# Patient Record
Sex: FEMALE | Race: ASIAN | Employment: OTHER | URBAN - METROPOLITAN AREA
[De-identification: names, ages, dates, MRNs, and addresses within clinical notes are randomized per-mention and may not be internally consistent; named-entity substitution may affect disease eponyms.]

---

## 2017-06-07 ENCOUNTER — HOSPITAL ENCOUNTER (OUTPATIENT)
Dept: MRI IMAGING | Age: 72
Discharge: HOME | End: 2017-06-07
Attending: OTOLARYNGOLOGY
Payer: MEDICARE

## 2017-06-07 DIAGNOSIS — M26.609 TMJ DISEASE: ICD-10-CM

## 2017-06-07 LAB — CREAT UR-MCNC: 0.5 MG/DL (ref 0.6–1.3)

## 2017-06-07 PROCEDURE — 70553 MRI BRAIN STEM W/O & W/DYE: CPT

## 2017-06-07 PROCEDURE — 82565 ASSAY OF CREATININE: CPT

## 2017-06-07 PROCEDURE — 74011250636 HC RX REV CODE- 250/636: Performed by: OTOLARYNGOLOGY

## 2017-06-07 PROCEDURE — A9585 GADOBUTROL INJECTION: HCPCS | Performed by: OTOLARYNGOLOGY

## 2017-06-07 RX ADMIN — GADOBUTROL 7.5 ML: 604.72 INJECTION INTRAVENOUS at 09:38

## 2017-07-15 ENCOUNTER — APPOINTMENT (OUTPATIENT)
Dept: GENERAL RADIOLOGY | Age: 72
End: 2017-07-15
Attending: PHYSICIAN ASSISTANT
Payer: MEDICARE

## 2017-07-15 ENCOUNTER — HOSPITAL ENCOUNTER (EMERGENCY)
Age: 72
Discharge: HOME | End: 2017-07-15
Attending: EMERGENCY MEDICINE
Payer: MEDICARE

## 2017-07-15 VITALS
RESPIRATION RATE: 16 BRPM | DIASTOLIC BLOOD PRESSURE: 64 MMHG | TEMPERATURE: 97.7 F | WEIGHT: 145 LBS | HEART RATE: 66 BPM | BODY MASS INDEX: 24.16 KG/M2 | HEIGHT: 65 IN | SYSTOLIC BLOOD PRESSURE: 128 MMHG | OXYGEN SATURATION: 100 %

## 2017-07-15 DIAGNOSIS — M25.562 ARTHRALGIA OF LEFT LOWER LEG: Primary | ICD-10-CM

## 2017-07-15 PROCEDURE — 73564 X-RAY EXAM KNEE 4 OR MORE: CPT

## 2017-07-15 PROCEDURE — 93971 EXTREMITY STUDY: CPT

## 2017-07-15 PROCEDURE — 99282 EMERGENCY DEPT VISIT SF MDM: CPT

## 2017-07-15 RX ORDER — IBUPROFEN 400 MG/1
400 TABLET ORAL
Qty: 20 TAB | Refills: 0 | Status: SHIPPED | OUTPATIENT
Start: 2017-07-15 | End: 2020-01-02

## 2017-07-15 NOTE — ED PROVIDER NOTES
HPI Comments: 4:05 PM  Moise Clement is a 67 y.o. female presenting to the ED C/O left knee pain onset yesterday, 3 hours into a 7 hour drive to [place]. Associated sx include mild left knee swelling. Pain is worse with walking and better at rest. Pt states she hit it on a coffee table 3 days ago, but had no pain until yesterday. She performed water aerobics for 1 hour 2 days ago without any knee pain. Pain was so severe yesterday that pt had difficulty with ambulation. Today, they drove 5 additional hours home from [place], and pain worsened. She took 2 of Ibuprofen yesterday, and 2 of Aspirin today with mild relief. SHx includes left knee arthroscopy 10 years ago. Pt denies blood thinners, h/o PE or DVT, SOB, chest pain, and any other symptoms or complaints at this time. Patient is a 67 y.o. female presenting with knee pain. The history is provided by the patient. No  was used. Knee Pain    This is a new problem. The current episode started yesterday. The problem has not changed since onset. The pain is present in the left knee. There has been a history of trauma. Past Medical History:   Diagnosis Date    Arthritis     Menopause     Palpitations 6/6/2013    Rheumatoid arthritis ([facility] [address]        Past Surgical History:   Procedure Laterality Date    HX APPENDECTOMY      HX BLADDER SUSPENSION      HX ORTHOPAEDIC      arm surgery         History reviewed. No pertinent family history. Social History     Social History    Marital status:      Spouse name: N/A    Number of children: N/A    Years of education: N/A     Occupational History    Not on file.      Social History Main Topics    Smoking status: Never Smoker    Smokeless tobacco: Never Used    Alcohol use Yes      Comment: rare    Drug use: No    Sexual activity: Not on file     Other Topics Concern    Not on file     Social History Narrative         ALLERGIES: Ciprofloxacin and Zyrtec [cetirizine]    Review of Systems   Respiratory: Negative for shortness of breath. Cardiovascular: Negative for chest pain. Musculoskeletal: Positive for arthralgias (left knee) and joint swelling (left knee). All other systems reviewed and are negative. Vitals:    07/15/17 1610   BP: 128/64   Pulse: 66   Resp: 16   Temp: 97.7 °F (36.5 °C)   SpO2: 100%   Weight: 65.8 kg (145 lb)   Height: 5' 5\" (1.651 m)            Physical Exam   Constitutional: She is oriented to person, place, and time. She appears well-developed and well-nourished. HENT:   Head: Normocephalic and atraumatic. Cardiovascular: Normal rate, regular rhythm, normal heart sounds and intact distal pulses. No murmur heard. Pulmonary/Chest: Effort normal and breath sounds normal. No respiratory distress. She has no wheezes. She has no rales. Musculoskeletal:        Left knee: She exhibits normal range of motion, no swelling, no effusion, no ecchymosis, no deformity and no erythema. Tenderness (posterior ) found. LLE: TTP over the posterior knee, no obvious swelling, erythema or increased warmth, pulses 2+ for DP and PT      Neurological: She is alert and oriented to person, place, and time. Skin: Skin is warm and dry. Psychiatric: She has a normal mood and affect. Judgment normal.   Nursing note and vitals reviewed. RESULTS:    PULSE OXIMETRY NOTE:  Pulse-ox is 100% on room air  Interpretation: Normal       DUPLEX LOWER EXT VENOUS LEFT   INTERPRETATION/FINDINGS  Duplex images were obtained using 2-D gray scale, color flow, and  spectral Doppler analysis. Left leg :  1. Deep vein(s) visualized include the common femoral, deep femoral,  proximal femoral, mid femoral, distal femoral, popliteal(above knee),  popliteal(fossa), popliteal(below knee), posterior tibial and peroneal   veins. 2. No evidence of deep venous thrombosis detected in the veins  visualized.   3. No evidence of deep vein thrombosis in the contralateral common  femoral vein. As read by the technologist.      RADIOLOGY RESULT  5:28 PM   Left knee X-ray shows no acute process. Pending review by radiologist.   Written by Vandana Riddle ED Scribe, as dictated by Samantha Mcneil PA-C. Labs Reviewed - No data to display    No results found for this or any previous visit (from the past 12 hour(s)). MDM  Number of Diagnoses or Management Options  Arthralgia of left lower leg:   Diagnosis management comments: Knee fx, ligamentous injury, DVT, muscular strain, Baker's cyst, doubt claudication, vasculitis         Amount and/or Complexity of Data Reviewed  Tests in the radiology section of CPT®: reviewed and ordered (Duplex LLE, left knee XR)  Independent visualization of images, tracings, or specimens: yes (left knee XR)      ED Course     Medications - No data to display    Procedures    PROGRESS NOTE:  4:05 PM  Initial assessment performed. Recorded by Vandana Riddle ED Scribe, as dictated by Samantha Mcneil PA-C. Discharge Note:  6:02 PM   Edwina Dixon results have been reviewed with her and/or her family. She has been counseled regarding her diagnosis, treatment, and plan. She verbally conveys understanding and agreement of the signs, symptoms, diagnosis, treatment and prognosis and additionally agrees to follow up as discussed. She also agrees with the care-plan and conveys that all of her questions have been answered. I have also provided discharge instructions for her that include: educational information regarding the diagnosis and treatment, and a list of reasons why She would want to return to the ED prior to her follow-up appointment, should her condition change. Proper ED utilization discussed with the patient. CLINICAL IMPRESSION    1.  Arthralgia of left lower leg        After visit plan:  D/c home    Discharge Medication List as of 7/15/2017  6:02 PM      START taking these medications    Details   ibuprofen (MOTRIN) 400 mg tablet Take 1 Tab by mouth every six (6) hours as needed for Pain., Print, Disp-20 Tab, R-0         CONTINUE these medications which have NOT CHANGED    Details   omega-3 fatty acids-vitamin e (FISH OIL) 1,000 mg cap Take 1 Cap by mouth., Historical Med      calcium-cholecalciferol, D3, (CALTRATE 600+D) tablet Take 1 tablet by mouth daily. , Historical Med      ascorbic acid (VITAMIN C) 500 mg tablet Take 500 mg by mouth two (2) times a day., Historical Med      cyanocobalamin 1,000 mcg tablet Take 1,000 mcg by mouth daily. , Historical Med      OTHER Black cohash 54mg daily, Historical Med         STOP taking these medications       naproxen (NAPROSYN) 375 mg tablet Comments:   Reason for Stopping: Follow-up Information     Follow up With Details Comments [address], MD Schedule an appointment as soon as possible for a visit [address] [place] [address] [address] [facility]      THE [facility] EMERGENCY DEPT  As needed, If symptoms worsen [address] [place] Code [id number]  [phone]          This note is prepared by Cortes Coughlin, acting as Scribe for Amber Wheeler PA-C. Amber Wheeler PA-C: The scribe's documentation has been prepared under my direction and personally reviewed by me in its entirety. I confirm that the note above accurately reflects all work, treatment, procedures, and medical decision making performed by me.

## 2017-07-15 NOTE — ED NOTES
I have reviewed discharge instructions with the patient and spouse. The patient and spouse verbalized understanding. Patient armband removed and shredded. One prescription given to patient.

## 2017-07-15 NOTE — PROCEDURES
[facility]  *** FINAL REPORT ***    Name: Akshat Dahl  MRN: [id number]    Outpatient  : 07 May 1945  HIS Order #: [id number]  [address] [facility] Visit #: [id number]  Date: 15 Jul 2017    TYPE OF TEST: Peripheral Venous Testing    REASON FOR TEST  Pain in limb    Left Leg:-  Deep venous thrombosis:           No  Superficial venous thrombosis:    Not examined  Deep venous insufficiency:        Not examined  Superficial venous insufficiency: Not examined      INTERPRETATION/FINDINGS  Duplex images were obtained using 2-D gray scale, color flow, and  spectral Doppler analysis. Left leg :  1. Deep vein(s) visualized include the common femoral, deep femoral,  proximal femoral, mid femoral, distal femoral, popliteal(above knee),  popliteal(fossa), popliteal(below knee), posterior tibial and peroneal   veins. 2. No evidence of deep venous thrombosis detected in the veins  visualized. 3. No evidence of deep vein thrombosis in the contralateral common  femoral vein. ADDITIONAL COMMENTS    I have personally reviewed the data relevant to the interpretation of  this  study. TECHNOLOGIST: Rafa VILLALPANDO, RVT  Signed: 07/15/2017 05:13 PM    PHYSICIAN: Ayana Bailey MD  Signed: 2017 03:15 PM

## 2017-07-15 NOTE — ED TRIAGE NOTES
Patient complaining of left knee pain. Patient reports on Wednesday she hit it against a cabinet and yesterday pain was so severe she was unable to walk. Patient reports she had an arthroscopy on left knee x10 years ago.

## 2017-10-02 ENCOUNTER — HOSPITAL ENCOUNTER (OUTPATIENT)
Dept: LAB | Age: 72
Discharge: HOME | End: 2017-10-02
Payer: MEDICARE

## 2017-10-02 PROCEDURE — 88305 TISSUE EXAM BY PATHOLOGIST: CPT | Performed by: SURGERY

## 2017-10-02 PROCEDURE — 88173 CYTOPATH EVAL FNA REPORT: CPT | Performed by: SURGERY

## 2018-10-20 ENCOUNTER — HOSPITAL ENCOUNTER (OUTPATIENT)
Dept: CT IMAGING | Age: 73
Discharge: HOME | End: 2018-10-20
Attending: OTOLARYNGOLOGY
Payer: MEDICARE

## 2018-10-20 DIAGNOSIS — R22.1 NECK MASS: ICD-10-CM

## 2018-10-20 LAB — CREAT UR-MCNC: 0.5 MG/DL (ref 0.6–1.3)

## 2018-10-20 PROCEDURE — 70491 CT SOFT TISSUE NECK W/DYE: CPT

## 2018-10-20 PROCEDURE — 82565 ASSAY OF CREATININE: CPT

## 2018-10-20 PROCEDURE — 74011636320 HC RX REV CODE- 636/320: Performed by: OTOLARYNGOLOGY

## 2018-10-20 RX ADMIN — IOPAMIDOL 75 ML: 612 INJECTION, SOLUTION INTRAVENOUS at 10:51

## 2018-11-06 ENCOUNTER — HOSPITAL ENCOUNTER (OUTPATIENT)
Dept: ULTRASOUND IMAGING | Age: 73
Discharge: HOME | End: 2018-11-06
Attending: OTOLARYNGOLOGY
Payer: MEDICARE

## 2018-11-06 DIAGNOSIS — E04.2 MULTINODULAR GOITER: ICD-10-CM

## 2018-11-06 DIAGNOSIS — E04.2 NONTOXIC MULTINODULAR GOITER: ICD-10-CM

## 2018-11-06 DIAGNOSIS — R22.1 LOCALIZED SWELLING, MASS AND LUMP, NECK: ICD-10-CM

## 2018-11-06 PROCEDURE — 76942 ECHO GUIDE FOR BIOPSY: CPT

## 2018-11-06 PROCEDURE — 88305 TISSUE EXAM BY PATHOLOGIST: CPT | Performed by: OTOLARYNGOLOGY

## 2018-11-06 PROCEDURE — 88172 CYTP DX EVAL FNA 1ST EA SITE: CPT | Performed by: OTOLARYNGOLOGY

## 2018-11-06 PROCEDURE — 88173 CYTOPATH EVAL FNA REPORT: CPT | Performed by: OTOLARYNGOLOGY

## 2018-11-06 PROCEDURE — 10022 US GUIDE FINE NDL ASP THYROID: CPT

## 2018-11-06 PROCEDURE — 88177 CYTP FNA EVAL EA ADDL: CPT | Performed by: OTOLARYNGOLOGY

## 2018-11-06 PROCEDURE — 10022 US GUIDE FINE NDL ASP W IMAGE: CPT

## 2018-11-06 RX ORDER — LIDOCAINE HYDROCHLORIDE 10 MG/ML
INJECTION, SOLUTION EPIDURAL; INFILTRATION; INTRACAUDAL; PERINEURAL
Status: DISPENSED
Start: 2018-11-06 | End: 2018-11-07

## 2018-11-06 RX ORDER — HYDRALAZINE HYDROCHLORIDE 20 MG/ML
20 INJECTION INTRAMUSCULAR; INTRAVENOUS ONCE
Status: DISCONTINUED | OUTPATIENT
Start: 2018-11-06 | End: 2018-11-06 | Stop reason: CLARIF

## 2018-11-06 RX ORDER — LIDOCAINE HYDROCHLORIDE 10 MG/ML
20 INJECTION, SOLUTION EPIDURAL; INFILTRATION; INTRACAUDAL; PERINEURAL
Status: ACTIVE | OUTPATIENT
Start: 2018-11-06 | End: 2018-11-07

## 2018-11-06 NOTE — PROGRESS NOTES
Ultrasound Guided FNA done in 3 areas- Right Submandibular- Right mid pole Thyroid- Left Medial Thyroid    Patient tolerated procedure with out pain. Patient was given ice bags and discharge instructions from Radiologist.  Patient was discharged home in stable condition.

## 2019-02-11 ENCOUNTER — HOSPITAL ENCOUNTER (OUTPATIENT)
Dept: CT IMAGING | Age: 74
Discharge: HOME | End: 2019-02-11
Attending: OTOLARYNGOLOGY
Payer: MEDICARE

## 2019-02-11 DIAGNOSIS — R22.1 MASS OF NECK: ICD-10-CM

## 2019-02-11 LAB — CREAT UR-MCNC: 0.6 MG/DL (ref 0.6–1.3)

## 2019-02-11 PROCEDURE — 82565 ASSAY OF CREATININE: CPT

## 2019-02-11 PROCEDURE — 74011636320 HC RX REV CODE- 636/320: Performed by: OTOLARYNGOLOGY

## 2019-02-11 PROCEDURE — 70491 CT SOFT TISSUE NECK W/DYE: CPT

## 2019-02-11 RX ADMIN — IOPAMIDOL 100 ML: 612 INJECTION, SOLUTION INTRAVENOUS at 09:40

## 2019-12-30 ENCOUNTER — HOSPITAL ENCOUNTER (OUTPATIENT)
Dept: PREADMISSION TESTING | Age: 74
Discharge: HOME | End: 2019-12-30
Payer: MEDICARE

## 2019-12-30 ENCOUNTER — HOSPITAL ENCOUNTER (OUTPATIENT)
Dept: GENERAL RADIOLOGY | Age: 74
Discharge: HOME | End: 2019-12-30
Payer: MEDICARE

## 2019-12-30 DIAGNOSIS — Z01.818 PREOPERATIVE EXAMINATION, UNSPECIFIED: ICD-10-CM

## 2019-12-30 LAB
ALBUMIN SERPL-MCNC: 3.9 G/DL (ref 3.4–5)
ALBUMIN/GLOB SERPL: 1.2 {RATIO} (ref 0.8–1.7)
ALP SERPL-CCNC: 75 U/L (ref 45–117)
ALT SERPL-CCNC: 34 U/L (ref 13–56)
ANION GAP SERPL CALC-SCNC: 6 MMOL/L (ref 3–18)
APPEARANCE UR: CLEAR
APTT PPP: 30.9 SEC (ref 23–36.4)
AST SERPL-CCNC: 21 U/L (ref 10–38)
BASOPHILS # BLD: 0 K/UL (ref 0–0.1)
BASOPHILS NFR BLD: 0 % (ref 0–2)
BILIRUB SERPL-MCNC: 0.2 MG/DL (ref 0.2–1)
BILIRUB UR QL: NEGATIVE
BUN SERPL-MCNC: 18 MG/DL (ref 7–18)
BUN/CREAT SERPL: 29 (ref 12–20)
CALCIUM SERPL-MCNC: 8.8 MG/DL (ref 8.5–10.1)
CHLORIDE SERPL-SCNC: 109 MMOL/L (ref 100–111)
CO2 SERPL-SCNC: 27 MMOL/L (ref 21–32)
COLOR UR: YELLOW
CREAT SERPL-MCNC: 0.63 MG/DL (ref 0.6–1.3)
DIFFERENTIAL METHOD BLD: NORMAL
EOSINOPHIL # BLD: 0.1 K/UL (ref 0–0.4)
EOSINOPHIL NFR BLD: 2 % (ref 0–5)
ERYTHROCYTE [DISTWIDTH] IN BLOOD BY AUTOMATED COUNT: 12.5 % (ref 11.6–14.5)
GLOBULIN SER CALC-MCNC: 3.2 G/DL (ref 2–4)
GLUCOSE SERPL-MCNC: 107 MG/DL (ref 74–99)
GLUCOSE UR STRIP.AUTO-MCNC: NEGATIVE MG/DL
HCT VFR BLD AUTO: 39.6 % (ref 35–45)
HGB BLD-MCNC: 13.1 G/DL (ref 12–16)
HGB UR QL STRIP: NEGATIVE
INR PPP: 0.9 (ref 0.8–1.2)
KETONES UR QL STRIP.AUTO: NEGATIVE MG/DL
LEUKOCYTE ESTERASE UR QL STRIP.AUTO: NEGATIVE
LYMPHOCYTES # BLD: 3 K/UL (ref 0.9–3.6)
LYMPHOCYTES NFR BLD: 44 % (ref 21–52)
MCH RBC QN AUTO: 31.1 PG (ref 24–34)
MCHC RBC AUTO-ENTMCNC: 33.1 G/DL (ref 31–37)
MCV RBC AUTO: 94.1 FL (ref 74–97)
MONOCYTES # BLD: 0.6 K/UL (ref 0.05–1.2)
MONOCYTES NFR BLD: 9 % (ref 3–10)
NEUTS SEG # BLD: 3 K/UL (ref 1.8–8)
NEUTS SEG NFR BLD: 45 % (ref 40–73)
NITRITE UR QL STRIP.AUTO: NEGATIVE
PH UR STRIP: 5 [PH] (ref 5–8)
PLATELET # BLD AUTO: 334 K/UL (ref 135–420)
PMV BLD AUTO: 10.3 FL (ref 9.2–11.8)
POTASSIUM SERPL-SCNC: 4.3 MMOL/L (ref 3.5–5.5)
PROT SERPL-MCNC: 7.1 G/DL (ref 6.4–8.2)
PROT UR STRIP-MCNC: NEGATIVE MG/DL
PROTHROMBIN TIME: 12.2 SEC (ref 11.5–15.2)
RBC # BLD AUTO: 4.21 M/UL (ref 4.2–5.3)
SODIUM SERPL-SCNC: 142 MMOL/L (ref 136–145)
SP GR UR REFRACTOMETRY: 1.03 (ref 1–1.03)
UROBILINOGEN UR QL STRIP.AUTO: 0.2 EU/DL (ref 0.2–1)
WBC # BLD AUTO: 6.7 K/UL (ref 4.6–13.2)

## 2019-12-30 PROCEDURE — 81003 URINALYSIS AUTO W/O SCOPE: CPT

## 2019-12-30 PROCEDURE — 85730 THROMBOPLASTIN TIME PARTIAL: CPT

## 2019-12-30 PROCEDURE — 36415 COLL VENOUS BLD VENIPUNCTURE: CPT

## 2019-12-30 PROCEDURE — 71045 X-RAY EXAM CHEST 1 VIEW: CPT

## 2019-12-30 PROCEDURE — 80053 COMPREHEN METABOLIC PANEL: CPT

## 2019-12-30 PROCEDURE — 86900 BLOOD TYPING SEROLOGIC ABO: CPT

## 2019-12-30 PROCEDURE — 85025 COMPLETE CBC W/AUTO DIFF WBC: CPT

## 2019-12-30 PROCEDURE — 87086 URINE CULTURE/COLONY COUNT: CPT

## 2019-12-30 PROCEDURE — 85610 PROTHROMBIN TIME: CPT

## 2019-12-31 LAB
BACTERIA SPEC CULT: NORMAL
BACTERIA SPEC CULT: NORMAL
SERVICE CMNT-IMP: NORMAL

## 2020-01-01 LAB
ABO + RH BLD: NORMAL
BACTERIA SPEC CULT: NORMAL
BLOOD GROUP ANTIBODIES SERPL: NORMAL
SERVICE CMNT-IMP: NORMAL
SPECIMEN EXP DATE BLD: NORMAL

## 2020-01-08 ENCOUNTER — ANESTHESIA EVENT (OUTPATIENT)
Dept: SURGERY | Age: 75
DRG: 470 | End: 2020-01-08
Payer: MEDICARE

## 2020-01-09 RX ORDER — TRANEXAMIC ACID 10 MG/ML
1 INJECTION, SOLUTION INTRAVENOUS
Status: CANCELLED | OUTPATIENT
Start: 2020-01-09 | End: 2020-01-10

## 2020-01-10 ENCOUNTER — ANESTHESIA (OUTPATIENT)
Dept: SURGERY | Age: 75
DRG: 470 | End: 2020-01-10
Payer: MEDICARE

## 2020-01-10 ENCOUNTER — APPOINTMENT (OUTPATIENT)
Dept: GENERAL RADIOLOGY | Age: 75
DRG: 470 | End: 2020-01-10
Attending: PHYSICIAN ASSISTANT
Payer: MEDICARE

## 2020-01-10 ENCOUNTER — HOSPITAL ENCOUNTER (INPATIENT)
Age: 75
LOS: 2 days | Discharge: HOME HEALTH | DRG: 470 | End: 2020-01-12
Attending: ORTHOPAEDIC SURGERY | Admitting: ORTHOPAEDIC SURGERY
Payer: MEDICARE

## 2020-01-10 DIAGNOSIS — Z96.652 TOTAL KNEE REPLACEMENT STATUS, LEFT: Primary | ICD-10-CM

## 2020-01-10 LAB
ABO + RH BLD: NORMAL
BLOOD GROUP ANTIBODIES SERPL: NORMAL
SPECIMEN EXP DATE BLD: NORMAL

## 2020-01-10 PROCEDURE — 74011250637 HC RX REV CODE- 250/637: Performed by: PHYSICIAN ASSISTANT

## 2020-01-10 PROCEDURE — 77030010507 HC ADH SKN DERMBND J&J -B: Performed by: ORTHOPAEDIC SURGERY

## 2020-01-10 PROCEDURE — 77030040815: Performed by: ORTHOPAEDIC SURGERY

## 2020-01-10 PROCEDURE — 77030002933 HC SUT MCRYL J&J -A: Performed by: ORTHOPAEDIC SURGERY

## 2020-01-10 PROCEDURE — 77010033678 HC OXYGEN DAILY

## 2020-01-10 PROCEDURE — C9290 INJ, BUPIVACAINE LIPOSOME: HCPCS | Performed by: ORTHOPAEDIC SURGERY

## 2020-01-10 PROCEDURE — 77030038010: Performed by: ORTHOPAEDIC SURGERY

## 2020-01-10 PROCEDURE — 77030018842 HC SOL IRR SOD CL 9% BAXT -A: Performed by: ORTHOPAEDIC SURGERY

## 2020-01-10 PROCEDURE — 73560 X-RAY EXAM OF KNEE 1 OR 2: CPT

## 2020-01-10 PROCEDURE — 74011250636 HC RX REV CODE- 250/636: Performed by: PHYSICIAN ASSISTANT

## 2020-01-10 PROCEDURE — 77030000032 HC CUF TRNQT ZIMM -B: Performed by: ORTHOPAEDIC SURGERY

## 2020-01-10 PROCEDURE — 74011250636 HC RX REV CODE- 250/636: Performed by: ANESTHESIOLOGY

## 2020-01-10 PROCEDURE — 74011000250 HC RX REV CODE- 250: Performed by: NURSE ANESTHETIST, CERTIFIED REGISTERED

## 2020-01-10 PROCEDURE — 64450 NJX AA&/STRD OTHER PN/BRANCH: CPT | Performed by: ANESTHESIOLOGY

## 2020-01-10 PROCEDURE — 86900 BLOOD TYPING SEROLOGIC ABO: CPT

## 2020-01-10 PROCEDURE — 74011250636 HC RX REV CODE- 250/636: Performed by: ORTHOPAEDIC SURGERY

## 2020-01-10 PROCEDURE — 77030020782 HC GWN BAIR PAWS FLX 3M -B: Performed by: ORTHOPAEDIC SURGERY

## 2020-01-10 PROCEDURE — 74011000250 HC RX REV CODE- 250: Performed by: ORTHOPAEDIC SURGERY

## 2020-01-10 PROCEDURE — 76942 ECHO GUIDE FOR BIOPSY: CPT | Performed by: ORTHOPAEDIC SURGERY

## 2020-01-10 PROCEDURE — 77030014144 HC TY SPN ANES BBMI -B: Performed by: ANESTHESIOLOGY

## 2020-01-10 PROCEDURE — 77030033067 HC SUT PDO STRATFX SPIR J&J -B: Performed by: ORTHOPAEDIC SURGERY

## 2020-01-10 PROCEDURE — C1776 JOINT DEVICE (IMPLANTABLE): HCPCS | Performed by: ORTHOPAEDIC SURGERY

## 2020-01-10 PROCEDURE — 77030040361 HC SLV COMPR DVT MDII -B: Performed by: ORTHOPAEDIC SURGERY

## 2020-01-10 PROCEDURE — 76060000035 HC ANESTHESIA 2 TO 2.5 HR: Performed by: ORTHOPAEDIC SURGERY

## 2020-01-10 PROCEDURE — 0SRD0J9 REPLACEMENT OF LEFT KNEE JOINT WITH SYNTHETIC SUBSTITUTE, CEMENTED, OPEN APPROACH: ICD-10-PCS | Performed by: ORTHOPAEDIC SURGERY

## 2020-01-10 PROCEDURE — 36415 COLL VENOUS BLD VENIPUNCTURE: CPT

## 2020-01-10 PROCEDURE — 74011000258 HC RX REV CODE- 258: Performed by: NURSE ANESTHETIST, CERTIFIED REGISTERED

## 2020-01-10 PROCEDURE — 77030011628: Performed by: ORTHOPAEDIC SURGERY

## 2020-01-10 PROCEDURE — C1713 ANCHOR/SCREW BN/BN,TIS/BN: HCPCS | Performed by: ORTHOPAEDIC SURGERY

## 2020-01-10 PROCEDURE — 77030037875 HC DRSG MEPILEX <16IN BORD MOLN -A: Performed by: ORTHOPAEDIC SURGERY

## 2020-01-10 PROCEDURE — 77030031139 HC SUT VCRL2 J&J -A: Performed by: ORTHOPAEDIC SURGERY

## 2020-01-10 PROCEDURE — 74011250637 HC RX REV CODE- 250/637: Performed by: ANESTHESIOLOGY

## 2020-01-10 PROCEDURE — 74011000258 HC RX REV CODE- 258: Performed by: ORTHOPAEDIC SURGERY

## 2020-01-10 PROCEDURE — 65270000029 HC RM PRIVATE

## 2020-01-10 PROCEDURE — 76010000131 HC OR TIME 2 TO 2.5 HR: Performed by: ORTHOPAEDIC SURGERY

## 2020-01-10 PROCEDURE — 77030016060 HC NDL NRV BLK TELE -A: Performed by: ANESTHESIOLOGY

## 2020-01-10 PROCEDURE — 76210000006 HC OR PH I REC 0.5 TO 1 HR: Performed by: ORTHOPAEDIC SURGERY

## 2020-01-10 PROCEDURE — 74011250636 HC RX REV CODE- 250/636: Performed by: NURSE ANESTHETIST, CERTIFIED REGISTERED

## 2020-01-10 PROCEDURE — 77030013708 HC HNDPC SUC IRR PULS STRY –B: Performed by: ORTHOPAEDIC SURGERY

## 2020-01-10 PROCEDURE — 77030027138 HC INCENT SPIROMETER -A: Performed by: ORTHOPAEDIC SURGERY

## 2020-01-10 DEVICE — CEMENT BNE 40GM FULL DOSE PMMA W/O ANTIBIO HI VISC N RADPQ: Type: IMPLANTABLE DEVICE | Site: KNEE | Status: FUNCTIONAL

## 2020-01-10 DEVICE — ATTUNE KNEE SYSTEM TIBIAL INSERT ROTATING PLATFORM POSTERIOR STABILIZED 4 6MM AOX
Type: IMPLANTABLE DEVICE | Site: KNEE | Status: FUNCTIONAL
Brand: ATTUNE

## 2020-01-10 DEVICE — INSERT TIB RP FEM KNEE CEM: Type: IMPLANTABLE DEVICE | Site: KNEE | Status: FUNCTIONAL

## 2020-01-10 DEVICE — ATTUNE PATELLA MEDIALIZED ANATOMIC 35MM CEMENTED AOX
Type: IMPLANTABLE DEVICE | Site: KNEE | Status: FUNCTIONAL
Brand: ATTUNE

## 2020-01-10 DEVICE — ATTUNE KNEE SYSTEM TIBIAL BASE ROTATING PLATFORM SIZE 4 CEMENTED
Type: IMPLANTABLE DEVICE | Site: KNEE | Status: FUNCTIONAL
Brand: ATTUNE

## 2020-01-10 DEVICE — CEMENT BNE 40GM FULL DOSE PMMA W/ GENT HI VISC RADPQ LNG: Type: IMPLANTABLE DEVICE | Site: KNEE | Status: FUNCTIONAL

## 2020-01-10 DEVICE — ATTUNE KNEE SYSTEM FEMORAL POSTERIOR STABILIZED NARROW SIZE 4N LEFT CEMENTED
Type: IMPLANTABLE DEVICE | Site: KNEE | Status: FUNCTIONAL
Brand: ATTUNE

## 2020-01-10 RX ORDER — LANOLIN ALCOHOL/MO/W.PET/CERES
1 CREAM (GRAM) TOPICAL
Status: DISCONTINUED | OUTPATIENT
Start: 2020-01-11 | End: 2020-01-12 | Stop reason: HOSPADM

## 2020-01-10 RX ORDER — HYDROMORPHONE HYDROCHLORIDE 1 MG/ML
1 INJECTION, SOLUTION INTRAMUSCULAR; INTRAVENOUS; SUBCUTANEOUS
Status: DISCONTINUED | OUTPATIENT
Start: 2020-01-10 | End: 2020-01-12 | Stop reason: HOSPADM

## 2020-01-10 RX ORDER — SODIUM CHLORIDE 0.9 % (FLUSH) 0.9 %
5-40 SYRINGE (ML) INJECTION AS NEEDED
Status: DISCONTINUED | OUTPATIENT
Start: 2020-01-10 | End: 2020-01-12 | Stop reason: HOSPADM

## 2020-01-10 RX ORDER — SODIUM CHLORIDE 0.9 % (FLUSH) 0.9 %
5-40 SYRINGE (ML) INJECTION EVERY 8 HOURS
Status: DISCONTINUED | OUTPATIENT
Start: 2020-01-10 | End: 2020-01-12 | Stop reason: HOSPADM

## 2020-01-10 RX ORDER — DIPHENHYDRAMINE HCL 25 MG
25 CAPSULE ORAL
Status: DISCONTINUED | OUTPATIENT
Start: 2020-01-10 | End: 2020-01-12 | Stop reason: HOSPADM

## 2020-01-10 RX ORDER — LORAZEPAM 0.5 MG/1
0.5 TABLET ORAL
Status: DISCONTINUED | OUTPATIENT
Start: 2020-01-10 | End: 2020-01-12 | Stop reason: HOSPADM

## 2020-01-10 RX ORDER — SODIUM CHLORIDE, SODIUM LACTATE, POTASSIUM CHLORIDE, CALCIUM CHLORIDE 600; 310; 30; 20 MG/100ML; MG/100ML; MG/100ML; MG/100ML
100 INJECTION, SOLUTION INTRAVENOUS CONTINUOUS
Status: DISPENSED | OUTPATIENT
Start: 2020-01-10 | End: 2020-01-11

## 2020-01-10 RX ORDER — SCOLOPAMINE TRANSDERMAL SYSTEM 1 MG/1
1 PATCH, EXTENDED RELEASE TRANSDERMAL
Status: DISCONTINUED | OUTPATIENT
Start: 2020-01-10 | End: 2020-01-10 | Stop reason: SDUPTHER

## 2020-01-10 RX ORDER — KETOROLAC TROMETHAMINE 15 MG/ML
15 INJECTION, SOLUTION INTRAMUSCULAR; INTRAVENOUS EVERY 6 HOURS
Status: DISPENSED | OUTPATIENT
Start: 2020-01-10 | End: 2020-01-11

## 2020-01-10 RX ORDER — MONTELUKAST SODIUM 10 MG/1
10 TABLET ORAL AS NEEDED
Status: DISCONTINUED | OUTPATIENT
Start: 2020-01-10 | End: 2020-01-12 | Stop reason: HOSPADM

## 2020-01-10 RX ORDER — SODIUM CHLORIDE, SODIUM LACTATE, POTASSIUM CHLORIDE, CALCIUM CHLORIDE 600; 310; 30; 20 MG/100ML; MG/100ML; MG/100ML; MG/100ML
1000 INJECTION, SOLUTION INTRAVENOUS CONTINUOUS
Status: DISCONTINUED | OUTPATIENT
Start: 2020-01-10 | End: 2020-01-10 | Stop reason: HOSPADM

## 2020-01-10 RX ORDER — ONDANSETRON 2 MG/ML
4 INJECTION INTRAMUSCULAR; INTRAVENOUS
Status: DISCONTINUED | OUTPATIENT
Start: 2020-01-10 | End: 2020-01-12 | Stop reason: HOSPADM

## 2020-01-10 RX ORDER — FLUMAZENIL 0.1 MG/ML
0.2 INJECTION INTRAVENOUS
Status: DISCONTINUED | OUTPATIENT
Start: 2020-01-10 | End: 2020-01-10 | Stop reason: HOSPADM

## 2020-01-10 RX ORDER — SODIUM CHLORIDE 0.9 % (FLUSH) 0.9 %
5-40 SYRINGE (ML) INJECTION EVERY 8 HOURS
Status: DISCONTINUED | OUTPATIENT
Start: 2020-01-10 | End: 2020-01-10 | Stop reason: HOSPADM

## 2020-01-10 RX ORDER — ALBUTEROL SULFATE 0.83 MG/ML
2.5 SOLUTION RESPIRATORY (INHALATION) AS NEEDED
Status: DISCONTINUED | OUTPATIENT
Start: 2020-01-10 | End: 2020-01-10 | Stop reason: HOSPADM

## 2020-01-10 RX ORDER — OXYCODONE HYDROCHLORIDE 5 MG/1
5 TABLET ORAL
Status: DISCONTINUED | OUTPATIENT
Start: 2020-01-10 | End: 2020-01-12 | Stop reason: HOSPADM

## 2020-01-10 RX ORDER — OXYCODONE AND ACETAMINOPHEN 5; 325 MG/1; MG/1
1 TABLET ORAL AS NEEDED
Status: DISCONTINUED | OUTPATIENT
Start: 2020-01-10 | End: 2020-01-10 | Stop reason: HOSPADM

## 2020-01-10 RX ORDER — HYDROMORPHONE HYDROCHLORIDE 2 MG/ML
0.5 INJECTION, SOLUTION INTRAMUSCULAR; INTRAVENOUS; SUBCUTANEOUS
Status: DISCONTINUED | OUTPATIENT
Start: 2020-01-10 | End: 2020-01-10 | Stop reason: HOSPADM

## 2020-01-10 RX ORDER — BUPIVACAINE HYDROCHLORIDE 2.5 MG/ML
INJECTION, SOLUTION EPIDURAL; INFILTRATION; INTRACAUDAL AS NEEDED
Status: DISCONTINUED | OUTPATIENT
Start: 2020-01-10 | End: 2020-01-10 | Stop reason: HOSPADM

## 2020-01-10 RX ORDER — NALOXONE HYDROCHLORIDE 0.4 MG/ML
0.2 INJECTION, SOLUTION INTRAMUSCULAR; INTRAVENOUS; SUBCUTANEOUS AS NEEDED
Status: DISCONTINUED | OUTPATIENT
Start: 2020-01-10 | End: 2020-01-10 | Stop reason: HOSPADM

## 2020-01-10 RX ORDER — NALOXONE HYDROCHLORIDE 0.4 MG/ML
0.1 INJECTION, SOLUTION INTRAMUSCULAR; INTRAVENOUS; SUBCUTANEOUS AS NEEDED
Status: DISCONTINUED | OUTPATIENT
Start: 2020-01-10 | End: 2020-01-10 | Stop reason: HOSPADM

## 2020-01-10 RX ORDER — ACETAMINOPHEN 500 MG
1000 TABLET ORAL
Status: COMPLETED | OUTPATIENT
Start: 2020-01-10 | End: 2020-01-10

## 2020-01-10 RX ORDER — PROPOFOL 10 MG/ML
INJECTION, EMULSION INTRAVENOUS
Status: DISCONTINUED | OUTPATIENT
Start: 2020-01-10 | End: 2020-01-10 | Stop reason: HOSPADM

## 2020-01-10 RX ORDER — PROCHLORPERAZINE EDISYLATE 5 MG/ML
5 INJECTION INTRAMUSCULAR; INTRAVENOUS ONCE
Status: DISCONTINUED | OUTPATIENT
Start: 2020-01-10 | End: 2020-01-10 | Stop reason: HOSPADM

## 2020-01-10 RX ORDER — MAGNESIUM SULFATE 100 %
4 CRYSTALS MISCELLANEOUS AS NEEDED
Status: DISCONTINUED | OUTPATIENT
Start: 2020-01-10 | End: 2020-01-10 | Stop reason: HOSPADM

## 2020-01-10 RX ORDER — FENTANYL CITRATE 50 UG/ML
25 INJECTION, SOLUTION INTRAMUSCULAR; INTRAVENOUS AS NEEDED
Status: DISCONTINUED | OUTPATIENT
Start: 2020-01-10 | End: 2020-01-10 | Stop reason: HOSPADM

## 2020-01-10 RX ORDER — SCOLOPAMINE TRANSDERMAL SYSTEM 1 MG/1
1 PATCH, EXTENDED RELEASE TRANSDERMAL
Status: DISCONTINUED | OUTPATIENT
Start: 2020-01-10 | End: 2020-01-10 | Stop reason: HOSPADM

## 2020-01-10 RX ORDER — CEFAZOLIN SODIUM 2 G/50ML
2 SOLUTION INTRAVENOUS ONCE
Status: COMPLETED | OUTPATIENT
Start: 2020-01-10 | End: 2020-01-10

## 2020-01-10 RX ORDER — FLUTICASONE PROPIONATE 50 MCG
2 SPRAY, SUSPENSION (ML) NASAL AS NEEDED
Status: DISCONTINUED | OUTPATIENT
Start: 2020-01-10 | End: 2020-01-12 | Stop reason: HOSPADM

## 2020-01-10 RX ORDER — SODIUM CHLORIDE 0.9 % (FLUSH) 0.9 %
5-40 SYRINGE (ML) INJECTION AS NEEDED
Status: DISCONTINUED | OUTPATIENT
Start: 2020-01-10 | End: 2020-01-10 | Stop reason: HOSPADM

## 2020-01-10 RX ORDER — NALOXONE HYDROCHLORIDE 0.4 MG/ML
0.4 INJECTION, SOLUTION INTRAMUSCULAR; INTRAVENOUS; SUBCUTANEOUS AS NEEDED
Status: DISCONTINUED | OUTPATIENT
Start: 2020-01-10 | End: 2020-01-12 | Stop reason: HOSPADM

## 2020-01-10 RX ORDER — SODIUM CHLORIDE, SODIUM LACTATE, POTASSIUM CHLORIDE, CALCIUM CHLORIDE 600; 310; 30; 20 MG/100ML; MG/100ML; MG/100ML; MG/100ML
125 INJECTION, SOLUTION INTRAVENOUS CONTINUOUS
Status: DISCONTINUED | OUTPATIENT
Start: 2020-01-10 | End: 2020-01-10

## 2020-01-10 RX ORDER — ROPIVACAINE HYDROCHLORIDE 5 MG/ML
INJECTION, SOLUTION EPIDURAL; INFILTRATION; PERINEURAL
Status: COMPLETED | OUTPATIENT
Start: 2020-01-10 | End: 2020-01-10

## 2020-01-10 RX ORDER — ACETAMINOPHEN 500 MG
1000 TABLET ORAL EVERY 8 HOURS
Status: DISCONTINUED | OUTPATIENT
Start: 2020-01-10 | End: 2020-01-12 | Stop reason: HOSPADM

## 2020-01-10 RX ORDER — CELECOXIB 100 MG/1
400 CAPSULE ORAL
Status: COMPLETED | OUTPATIENT
Start: 2020-01-10 | End: 2020-01-10

## 2020-01-10 RX ORDER — DEXTROSE MONOHYDRATE 100 MG/ML
125-250 INJECTION, SOLUTION INTRAVENOUS AS NEEDED
Status: DISCONTINUED | OUTPATIENT
Start: 2020-01-10 | End: 2020-01-10 | Stop reason: HOSPADM

## 2020-01-10 RX ORDER — OXYCODONE HYDROCHLORIDE 5 MG/1
10 TABLET ORAL
Status: DISCONTINUED | OUTPATIENT
Start: 2020-01-10 | End: 2020-01-12 | Stop reason: HOSPADM

## 2020-01-10 RX ORDER — FENTANYL CITRATE 50 UG/ML
50 INJECTION, SOLUTION INTRAMUSCULAR; INTRAVENOUS AS NEEDED
Status: DISCONTINUED | OUTPATIENT
Start: 2020-01-10 | End: 2020-01-10 | Stop reason: HOSPADM

## 2020-01-10 RX ORDER — MIDAZOLAM HYDROCHLORIDE 1 MG/ML
INJECTION, SOLUTION INTRAMUSCULAR; INTRAVENOUS
Status: COMPLETED | OUTPATIENT
Start: 2020-01-10 | End: 2020-01-10

## 2020-01-10 RX ORDER — CEFAZOLIN SODIUM 2 G/50ML
2 SOLUTION INTRAVENOUS EVERY 8 HOURS
Status: COMPLETED | OUTPATIENT
Start: 2020-01-10 | End: 2020-01-11

## 2020-01-10 RX ORDER — DIPHENHYDRAMINE HYDROCHLORIDE 50 MG/ML
12.5 INJECTION, SOLUTION INTRAMUSCULAR; INTRAVENOUS
Status: DISCONTINUED | OUTPATIENT
Start: 2020-01-10 | End: 2020-01-10 | Stop reason: HOSPADM

## 2020-01-10 RX ORDER — AMOXICILLIN 250 MG
1 CAPSULE ORAL 2 TIMES DAILY
Status: DISCONTINUED | OUTPATIENT
Start: 2020-01-10 | End: 2020-01-12 | Stop reason: HOSPADM

## 2020-01-10 RX ORDER — ASPIRIN 81 MG/1
81 TABLET ORAL 2 TIMES DAILY
Status: DISCONTINUED | OUTPATIENT
Start: 2020-01-10 | End: 2020-01-12 | Stop reason: HOSPADM

## 2020-01-10 RX ADMIN — TRANEXAMIC ACID 1 G: 100 INJECTION, SOLUTION INTRAVENOUS at 14:30

## 2020-01-10 RX ADMIN — PROPOFOL 50 MCG/KG/MIN: 10 INJECTION, EMULSION INTRAVENOUS at 13:36

## 2020-01-10 RX ADMIN — Medication 81 MG: at 20:13

## 2020-01-10 RX ADMIN — OXYCODONE HYDROCHLORIDE 10 MG: 5 TABLET ORAL at 17:35

## 2020-01-10 RX ADMIN — ONDANSETRON 4 MG: 2 INJECTION INTRAMUSCULAR; INTRAVENOUS at 22:25

## 2020-01-10 RX ADMIN — ACETAMINOPHEN 1000 MG: 500 TABLET ORAL at 10:55

## 2020-01-10 RX ADMIN — SODIUM CHLORIDE, SODIUM LACTATE, POTASSIUM CHLORIDE, AND CALCIUM CHLORIDE 100 ML/HR: 600; 310; 30; 20 INJECTION, SOLUTION INTRAVENOUS at 17:37

## 2020-01-10 RX ADMIN — CELECOXIB 400 MG: 100 CAPSULE ORAL at 10:55

## 2020-01-10 RX ADMIN — ACETAMINOPHEN 1000 MG: 500 TABLET ORAL at 18:06

## 2020-01-10 RX ADMIN — KETOROLAC TROMETHAMINE 15 MG: 15 INJECTION, SOLUTION INTRAMUSCULAR; INTRAVENOUS at 17:35

## 2020-01-10 RX ADMIN — CEFAZOLIN SODIUM 2 G: 2 SOLUTION INTRAVENOUS at 12:58

## 2020-01-10 RX ADMIN — SODIUM CHLORIDE, SODIUM LACTATE, POTASSIUM CHLORIDE, AND CALCIUM CHLORIDE: 600; 310; 30; 20 INJECTION, SOLUTION INTRAVENOUS at 13:58

## 2020-01-10 RX ADMIN — MEPIVACAINE HYDROCHLORIDE 10 ML: 20 INJECTION, SOLUTION EPIDURAL; INFILTRATION at 12:00

## 2020-01-10 RX ADMIN — ROPIVACAINE HYDROCHLORIDE 20 ML: 5 INJECTION, SOLUTION EPIDURAL; INFILTRATION; PERINEURAL at 12:00

## 2020-01-10 RX ADMIN — TRANEXAMIC ACID 1 G: 100 INJECTION, SOLUTION INTRAVENOUS at 13:09

## 2020-01-10 RX ADMIN — MIDAZOLAM 2 MG: 1 INJECTION INTRAMUSCULAR; INTRAVENOUS at 12:00

## 2020-01-10 RX ADMIN — OXYCODONE HYDROCHLORIDE 5 MG: 5 TABLET ORAL at 23:32

## 2020-01-10 RX ADMIN — SODIUM CHLORIDE, SODIUM LACTATE, POTASSIUM CHLORIDE, AND CALCIUM CHLORIDE 1000 ML: 600; 310; 30; 20 INJECTION, SOLUTION INTRAVENOUS at 16:03

## 2020-01-10 RX ADMIN — KETOROLAC TROMETHAMINE 15 MG: 15 INJECTION, SOLUTION INTRAMUSCULAR; INTRAVENOUS at 23:31

## 2020-01-10 RX ADMIN — MIDAZOLAM 1 MG: 1 INJECTION INTRAMUSCULAR; INTRAVENOUS at 13:09

## 2020-01-10 RX ADMIN — SENNOSIDES AND DOCUSATE SODIUM 1 TABLET: 8.6; 5 TABLET ORAL at 20:13

## 2020-01-10 RX ADMIN — MIDAZOLAM 1 MG: 1 INJECTION INTRAMUSCULAR; INTRAVENOUS at 12:58

## 2020-01-10 RX ADMIN — CEFAZOLIN SODIUM 2 G: 2 SOLUTION INTRAVENOUS at 20:13

## 2020-01-10 RX ADMIN — SODIUM CHLORIDE, SODIUM LACTATE, POTASSIUM CHLORIDE, AND CALCIUM CHLORIDE 125 ML/HR: 600; 310; 30; 20 INJECTION, SOLUTION INTRAVENOUS at 10:49

## 2020-01-10 NOTE — PERIOP NOTES
Family updated , pt to go to [facility], Madelin Leventhal, RN will be receiving nurse. SBAR is ready.

## 2020-01-10 NOTE — OP NOTES
[address]  [address] [facility], [address] [place] [address], [phone]    TOTAL KNEE ARTHROPLASTY    Patient: César Ramírez MRN: [id number]  SSN: [id number]    YOB: 1945  Age: 76 y.o. Sex: female      Date of Surgery: 1/10/2020   Preoperative Diagnosis: OSTEOARTHRITIS LEFT KNEE   Postoperative Diagnosis: OSTEOARTHRITIS LEFT KNEE   Location: [facility]  Surgeon: Kaleen Nissen, MD  Physician Assistant: BERTO Potts was medically necessary for holding of retractors for exposure and to complete the case. Assistant: Circ-1: Saeid Cruz RN  Physician Assistant: Mesfin Tompkins PA-C  Retractor Lundy: Jeff Flores  Surg Asst-1: Teaganather Perfect    Anesthesia: Regional + Low femoral Nerve Block + local    Procedure: Left Total Knee Arthroplasty (CPT: 47816)    Findings:  Degenerative joint disease of the left knee     Estimated Blood Loss: 50 mL    Fluids: see anesthesia record    Specimens: None    Cultures: None    Complications: None    Tourniquet Time:   Total Tourniquet Time Documented:  Thigh (Left) - 57 minutes  Total: Thigh (Left) - 57 minutes    Tourniquet Pressure: 250 mm Hg    Tranexamic Acid: 1 gram IV: one dose at begining of case and one at the end    Exparel solution: 20 mL (13 mg/mL) + 40 mL NS    Implants:   Implant Name Type Inv.  Item Serial No.  Lot No. LRB No. Used Action   CEMENT BNE GENTAM GHV 40GM -- SMARTSET - [id number]  CEMENT  [facility] GHV 40GM -- SMARTSET  [address] [employer] [facility] [id number] Left 1 Implanted   CEMENT BNE SMARTSET HV 40GM -- ORDER IN SETS OF 20 - [id number]  CEMENT BNE SMARTSET HV 40GM -- ORDER IN SETS OF 20  [employer] [id number] Left 1 Implanted   TIBIAL BASE     [employer] [id number] Left 1 Implanted   FEM PS ABDULLAHI APARNA LT SZ 4 -- ATTUNE - [id number]  FEM PS ABDULLAHI APARNA LT SZ 4 -- ATTUNE  [employer] [id number] Left 1 Implanted   INSERT TIB PS RP SZ4 6MM -- ATTUNE - [id number]  INSERT TIB PS RP SZ4 6MM -- ATTUNE  [facility] [employer] [id number] Left 1 Implanted   PAT APARNA MEDIAL JAYDEN 35MM -- ATTUNE - [id number]  PAT APARNA MEDIAL JAYDEN 35MM -- ATTUNE  [facility] [id number] Left 1 Implanted        Patient Condition: Stable.    ---------  INDICATIONS:   This 76y.o.-year-old female has had pain in the left knee for years and has become functionally disabled with respect to the activities of daily living. The pain is increased with weight-bearing. The pain and dysfunction were not releaved by at least three months of conservative therapy such as NSAIDS (ibuprofen, aleve), analgesics (tylenol), structured flexibility and muscle strengthening physical therapy exercises, activity restrictions, intra-articular cortisone injections, bracing, and use of a cane. The radiographs showed varus alignment and advanced arthritis with joint space narrowing, subchondral sclerosis, and periarticular osteophytes. A left total knee replacement has been recommended. The risks and the possible complications of this surgery and anesthesia including, but not exclusive to, bleeding, infection, dislocation, fracture, blood clots, nerve and vascular injury, possible need for other procedures, and possibly death have been explained to the patient. The patient accepts these risks for the benefits of joint replacement over the failure of non-operative care to provide adequate pain relief and improve their functional disability. The patients medical problems have been addressed by their primary care physician and are deemed stable and as well controlled as possible. Inpatient hospital care was medically necessary, reasonable, and appropriate. This is a medically necessary procedure. As such, without use of a surgical assistant to retract soft tissues, protect vital neuro-vascular structures and assist in the technical aspects of the operation, this procedure would not have been possible. Therefore this assistant was medically necessary. DESCRIPTION OF PROCEDURE:    After the risks and benefits of surgery were discussed, informed consent was obtained. The patient was identified in the preoperative holding area and the operative extremity was marked. Preoperatively a low femoral nerve block was performed by anesthesia. The patient was taken to the operating room and placed in the supine position. Spinal anesthesia was performed. IV antibiotics were given. After verification of the appropriate operative site from the consent form, with confirmation of surgeon, anesthesia and nursing teams, a tourniquet was placed and the left leg was prepped and draped in sterile fashion using Chloraprep. A formal timeout was performed. The tourniquet was inflated and a midline incision was made over the anterior knee medial to the tibial tubercle and the dissection was taken down to Mignon's fascia. A medial parapatellar arthrotomy was performed, medial release was made and the infrapatellar fat pad was trimmed. The anterior portions of the medial and lateral menisci were excised. The patella measured 21 mm. A freehand patellar cut was made followed by placement of the protective plate. The knee was flexed and the patella was  allowed to subluxate into the lateral gutter and the knee was flexed. Inspection of the knee revealed cartilage loss from all three compartments greatest medially. The femur was drilled and intramedullary cutting jig was placed in 5 degrees of valgus and 9 mm of distal resection. The distal femoral cut was made. The tibia was then subluxed anteriorly with a large bent knee retractor. The PCL was released from the posterior tibia. The remaining medial and lateral menisci and the periarticular osteophytes were removed. The lateral genicular artery was cauterized.   An extramedullary tibial guide was used and a tibial cut was made just inferior to the osteoarticular junction with tibial slope of 10 degrees - matching the patient's native slope. The extension gap was assessed to ensure full extension could be achieved. The PCL was released. A sizing tibial plate was then pinned into place and alignment was checked. The tibia was reamed and broached and any additional osteophytes were removed. The femoral sizer was used to measure the femoral size as well as estimate femoral component rotation using the posterior condylar line as a reference. Gap measuring blocks and the gap /sizing device was then used to examine flexion and extension gaps and confirm femoral component size and rotation. Positioning pins for the distal femoral cutting block were placed into the femur through the /sizing device. The four-in-one cutting block was placed. Anterior-posterior position of the cutting guide was checked using a [employer]. The anterior, posterior and chamfer cuts were made. Using the Femoral Finishing Guide, posterior osteophytes were removed and the notch bone was removed. Tibial and femoral trial implants were placed. The patella preparation was then completed with sizing and drilling of the patellar lugs. The trial patellar implant was placed and measured 22 mm. Patellar tracking was midline so no lateral retinacular release was needed. Ligament balancing was performed as needed with release of MCL done on approach. The PCL was sacrificed so a PS RP implant was used. Excellent stability was achieved. The trial components were removed. 20 cc of Exparel solution and 10 cc of 0.25% Marcaine were injected into the posterior soft tissues. Care was taken to aspirate prior to injecting to prevent intravascular anesthetic injection. The cement was prepared. After preparing the bony surfaces with pulsatile lavage saline, the real components were cemented into place with the trial liner.   Excess cement was removed prior to hardening with the cement allowed to set up with axial pressure across the knee in full extension. The remaining 40 cc of Exparel solution and an additional 20 cc of 0.25% Marcaine were injected into the deep and superficial soft tissues around the knee as well as the periosteum. After drying of the cement, the knee was checked for any remaining excess cement and irrigated. The tourniquet was released and hemostasis was achieved. The real polyethylene liner was inserted. A standard layered closure was performed using #1 Stratafix PDS for the fascia, 0 and 3-0 Vicryl for the subcutaneous and subcuticular layers followed by a running subcuticular skin closure with a 3-0 Monocryl. PRINEO was applied. Sterile dressings were placed. The patient was awakened and there were no complications. Final sponge and needle counts were correct x2.     Steven Ku MD

## 2020-01-10 NOTE — ROUTINE PROCESS
TRANSFER - IN REPORT:    Verbal report received from [facility] SULEMAN GERMAN RN(name) on [facility]  being received from [facility]) for routine post - op      Report consisted of patients Situation, Background, Assessment and   Recommendations(SBAR). Information from the following report(s) SBAR, Kardex, [facility] and Recent Results was reviewed with the receiving nurse. Opportunity for questions and clarification was provided. Assessment completed upon patients arrival to unit and care assumed.

## 2020-01-10 NOTE — PROGRESS NOTES
Assumed care of patient. Patient is alert and oriented x 4. Patient is calm and cooperative. Patients denies numbness or tingling to upper extermities. Pt states she has numbness in lower extermities pedal pulses palpable and equal bilaterally. Capillary Refill less than 3 seconds. Lung sounds clear bilaterally. Respirations even and unlabored. no use of accessory muscles. Abdomen is soft and non-tender. Bowel sounds active to x4 quadrants. Patient has not voided post-operatively. No bladder distention evident. No complaints of bladder discomfort. Skin is warm , dry and skin color is appropriate to race. Ace wrap dressing to left knee noted CDI. no other skin integrity issues present. Margarito hose applied to right lower extermity. Plexis applied to bilaterally. 18G IV intact to R hand and infusing without difficulty. Reports pain 2/10. Call bell within reach. Bed in low position. Three side rails up. 1735-Gave PRN pain medication at this time. Gave patient 10mg of Roxicodone. Pt stated pain was 6/10. Explanation of side effects given. Opportunity for questions given. Shift Summary-  Pt is alert and oriented x 4. Pt had uneventful shift. Pt has not voided or ambulated. Night nurse aware. Pain controlled by PRN medication.

## 2020-01-10 NOTE — ANESTHESIA PREPROCEDURE EVALUATION
Relevant Problems   No relevant active problems       Anesthetic History   No history of anesthetic complications            Review of Systems / Medical History  Patient summary reviewed, nursing notes reviewed and pertinent labs reviewed    Pulmonary  Within defined limits                 Neuro/Psych   Within defined limits           Cardiovascular  Within defined limits                Exercise tolerance: >4 METS     GI/Hepatic/Renal  Within defined limits              Endo/Other        Arthritis     Other Findings              Physical Exam    Airway  Mallampati: II  TM Distance: 4 - 6 cm  Neck ROM: normal range of motion   Mouth opening: Normal     Cardiovascular               Dental         Pulmonary  Breath sounds clear to auscultation               Abdominal  GI exam deferred       Other Findings            Anesthetic Plan    ASA: 1  Anesthesia type: regional and spinal      Post-op pain plan if not by surgeon: peripheral nerve block single    Induction: Intravenous  Anesthetic plan and risks discussed with: Patient

## 2020-01-10 NOTE — ANESTHESIA PROCEDURE NOTES
Peripheral Block    Start time: 1/10/2020 11:59 AM  End time: 1/10/2020 12:01 PM  Performed by: Luis Manuel Santiago MD  Authorized by: Luis Manuel Santiago MD       Pre-procedure: Indications: at surgeon's request and post-op pain management    Preanesthetic Checklist: patient identified, risks and benefits discussed, site marked, timeout performed, anesthesia consent given and patient being monitored    Timeout Time: 11:59          Block Type:   Block Type:   Adductor canal  Laterality:  Left  Monitoring:  Standard ASA monitoring, responsive to questions, oxygen, continuous pulse ox and frequent vital sign checks  Injection Technique:  Single shot  Procedures: ultrasound guided    Patient Position: seated  Prep: chlorhexidine    Location:  Mid thigh  Needle Type:  Stimuplex  Needle Gauge:  20 G  Needle Localization:  Ultrasound guidance    Assessment:  Number of attempts:  1  Injection Assessment:  Incremental injection every 5 mL, no paresthesia, ultrasound image on chart, local visualized surrounding nerve on ultrasound, negative aspiration for blood and no intravascular symptoms  Patient tolerance:  Patient tolerated the procedure well with no immediate complications

## 2020-01-10 NOTE — PERIOP NOTES
RN @ bedside, dual skin assessment done. VS obtained. No s/s of distress noted.     Visit Vitals  /57   Pulse 61   Temp 98 °F (36.7 °C)   Resp 14   Ht 5' 4\" (1.626 m)   Wt 64.4 kg (141 lb 14.4 oz)   SpO2 99%   BMI 24.36 kg/m²     Date 01/09/20 0700 - 01/10/20 0659(Not Admitted) 01/10/20 0700 - 01/11/20 0659   Shift 5601-7246 5222-5757 24 Hour Total 7330-0401 5021-1277 24 Hour Total   INTAKE   I.V.    2250  2250     I.V.    550  550     Volume (lactated Ringers infusion)    1700  1700   Shift Total(mL/kg)    2250(35)  2250(35)   OUTPUT   Urine    0  0     Urine Output    0  0   Blood    50  50     Estimated Blood Loss    50  50   Shift Total(mL/kg)    50(0.8)  50(0.8)   NET    2200  2200   Weight (kg)    64.4 64.4 64.4

## 2020-01-10 NOTE — ANESTHESIA POSTPROCEDURE EVALUATION
Post-Anesthesia Evaluation and Assessment    Cardiovascular Function/Vital Signs  Visit Vitals  /55   Pulse 68   Temp 36.9 °C (98.4 °F)   Resp 15   Ht 5' 4\" (1.626 m)   Wt 64.4 kg (141 lb 14.4 oz)   SpO2 98%   BMI 24.36 kg/m²       Patient is status post Procedure(s):  LEFT TOTAL KNEE ARTHROPLASTY AND ALL INDICATED PROCEDURES, \" SPEC POP\". Nausea/Vomiting: Controlled. Postoperative hydration reviewed and adequate. Pain:  Pain Scale 1: Numeric (0 - 10) (01/10/20 1548)  Pain Intensity 1: 0 (01/10/20 1548)   Managed. Neurological Status:   Neuro (WDL): Exceptions to WDL (01/10/20 1548)   At baseline. Mental Status and Level of Consciousness: Baseline and appropriate for discharge. Pulmonary Status:   O2 Device: Room air (01/10/20 1548)   Adequate oxygenation and airway patent. Complications related to anesthesia: None    Post-anesthesia assessment completed. No concerns. Patient has met all discharge requirements.     Signed By: Renato Torres CRNA    January 10, 2020

## 2020-01-10 NOTE — INTERVAL H&P NOTE
H&P Update:  Gabriela Goss was seen and examined. History and physical has been reviewed. The patient has been examined.  There have been no significant clinical changes since the completion of the originally dated History and Physical.

## 2020-01-10 NOTE — PERIOP NOTES
TRANSFER - OUT REPORT:    Verbal report given to [facility] RN(name) on Tg Mackey  being transferred to  310(unit) for routine post - op       Report consisted of patients Situation, Background, Assessment and   Recommendations(SBAR). Information from the following report(s) SBAR, Procedure Summary, Intake/Output, MAR and Cardiac Rhythm NSR was reviewed with the receiving nurse. Lines:   Peripheral IV 01/10/20 Right Hand (Active)   Site Assessment Clean, dry, & intact 1/10/2020  3:52 PM   Phlebitis Assessment 0 1/10/2020  3:52 PM   Infiltration Assessment 0 1/10/2020  3:52 PM   Dressing Status Clean, dry, & intact 1/10/2020  3:52 PM   Dressing Type Tape;Transparent 1/10/2020  3:52 PM   Hub Color/Line Status Green; Infusing 1/10/2020  3:52 PM        Opportunity for questions and clarification was provided.       Patient transported with:   Registered Nurse  Tech

## 2020-01-10 NOTE — ANESTHESIA PROCEDURE NOTES
Spinal Block    Start time: 1/10/2020 1:01 PM  End time: 1/10/2020 1:06 PM  Performed by: Yadiel Rodgers CRNA  Authorized by: Jean Pierre Guerrero MD     Pre-procedure: Indications: primary anesthetic  Preanesthetic Checklist: patient identified, risks and benefits discussed, anesthesia consent, site marked, patient being monitored and timeout performed    Timeout Time: 13:01          Spinal Block:   Patient Position:  Seated        Location:  L3-4  Technique:  Single shot    Local Dose (mL):  1.6    Needle:   Needle Type:   Sridevi  Needle Gauge:  25 G  Attempts:  1      Events: CSF confirmed and no paresthesia        Assessment:  Insertion:  Uncomplicated  Patient tolerance:  Patient tolerated the procedure well with no immediate complications

## 2020-01-10 NOTE — PROGRESS NOTES
Admitting as inpatient acknowledging increased risk of anesthesia and need for post-operative monitoring of co morbidities including RA. Admitting as inpatient anticipating stay of more than one post op midnight for post operative rehabilitation, pain management and monitoring for post operative complications.

## 2020-01-11 ENCOUNTER — HOME HEALTH ADMISSION (OUTPATIENT)
Dept: HOME HEALTH SERVICES | Facility: HOME HEALTH | Age: 75
End: 2020-01-11
Payer: MEDICARE

## 2020-01-11 PROCEDURE — 97165 OT EVAL LOW COMPLEX 30 MIN: CPT

## 2020-01-11 PROCEDURE — 74011250636 HC RX REV CODE- 250/636: Performed by: PHYSICIAN ASSISTANT

## 2020-01-11 PROCEDURE — 97161 PT EVAL LOW COMPLEX 20 MIN: CPT

## 2020-01-11 PROCEDURE — 65270000029 HC RM PRIVATE

## 2020-01-11 PROCEDURE — 74011250637 HC RX REV CODE- 250/637: Performed by: PHYSICIAN ASSISTANT

## 2020-01-11 PROCEDURE — 97116 GAIT TRAINING THERAPY: CPT

## 2020-01-11 RX ORDER — GUAIFENESIN 100 MG/5ML
81 LIQUID (ML) ORAL 2 TIMES DAILY
Qty: 84 TAB | Refills: 0 | Status: ON HOLD | OUTPATIENT
Start: 2020-01-11 | End: 2020-07-15

## 2020-01-11 RX ORDER — OXYCODONE AND ACETAMINOPHEN 5; 325 MG/1; MG/1
1-2 TABLET ORAL
Qty: 56 TAB | Refills: 0 | Status: SHIPPED | OUTPATIENT
Start: 2020-01-11 | End: 2020-02-10

## 2020-01-11 RX ADMIN — OXYCODONE HYDROCHLORIDE 5 MG: 5 TABLET ORAL at 19:26

## 2020-01-11 RX ADMIN — SENNOSIDES AND DOCUSATE SODIUM 1 TABLET: 8.6; 5 TABLET ORAL at 08:53

## 2020-01-11 RX ADMIN — Medication 10 ML: at 14:00

## 2020-01-11 RX ADMIN — ACETAMINOPHEN 1000 MG: 500 TABLET ORAL at 03:56

## 2020-01-11 RX ADMIN — KETOROLAC TROMETHAMINE 15 MG: 15 INJECTION, SOLUTION INTRAMUSCULAR; INTRAVENOUS at 05:09

## 2020-01-11 RX ADMIN — Medication 81 MG: at 08:53

## 2020-01-11 RX ADMIN — Medication 6 ML: at 22:00

## 2020-01-11 RX ADMIN — ACETAMINOPHEN 1000 MG: 500 TABLET ORAL at 11:55

## 2020-01-11 RX ADMIN — SENNOSIDES AND DOCUSATE SODIUM 1 TABLET: 8.6; 5 TABLET ORAL at 20:07

## 2020-01-11 RX ADMIN — Medication 81 MG: at 20:08

## 2020-01-11 RX ADMIN — KETOROLAC TROMETHAMINE 15 MG: 15 INJECTION, SOLUTION INTRAMUSCULAR; INTRAVENOUS at 11:55

## 2020-01-11 RX ADMIN — ACETAMINOPHEN 1000 MG: 500 TABLET ORAL at 18:18

## 2020-01-11 RX ADMIN — CEFAZOLIN SODIUM 2 G: 2 SOLUTION INTRAVENOUS at 04:00

## 2020-01-11 RX ADMIN — FERROUS SULFATE TAB 325 MG (65 MG ELEMENTAL FE) 325 MG: 325 (65 FE) TAB at 08:53

## 2020-01-11 NOTE — PROGRESS NOTES
Problem: Falls - Risk of  Goal: *Absence of Falls  Description  Document Ryann Boris Fall Risk and appropriate interventions in the flowsheet.   Outcome: Progressing Towards Goal  Note: Fall Risk Interventions:  Mobility Interventions: Patient to call before getting OOB, PT Consult for mobility concerns, PT Consult for assist device competence, Utilize walker, cane, or other assistive device    Mentation Interventions: Adequate sleep, hydration, pain control    Medication Interventions: Patient to call before getting OOB, Teach patient to arise slowly    Elimination Interventions: Patient to call for help with toileting needs, Call light in reach

## 2020-01-11 NOTE — DISCHARGE SUMMARY
[address] [address]     DISCHARGE SUMMARY     PATIENT: Rossy Tristan     MRN: [id number]   ADMIT DATE: 1/10/2020   [facility]   DISCHARGE DATE:  2020     ATTENDING: Tristan Cabral MD   DICTATING: Yeny Olmos PA-C     ADMISSION DIAGNOSIS: Total knee replacement status, left [Z96.652]    DISCHARGE DIAGNOSIS: Status post OSTEOARTHRITIS LEFT KNEE    HISTORY OF PRESENT ILLNESS: The patient is a 76y.o. year-old female   with ongoing left knee pain secondary to osteoarthritis of her left knee. The patient's pain has persisted and progressed despite conservative treatments and therapies. The patient has at this time opted for surgical intervention. PAST MEDICAL HISTORY:   Past Medical History:   Diagnosis Date    Adverse effect of anesthesia     couldnt smell for 1 year after bladder surgery patient states    Arthritis     Autoimmune disease ([facility] [address]     RA    Ill-defined condition     seasonal allergies    Menopause     Palpitations 2013    History of    Rheumatoid arthritis(714.0)        PAST SURGICAL HISTORY:   Past Surgical History:   Procedure Laterality Date    HX APPENDECTOMY      HX BLADDER SUSPENSION      .  HX KNEE ARTHROSCOPY Left 2005    HX ORTHOPAEDIC      arm surgery       ALLERGIES:   Allergies   Allergen Reactions    Ciprofloxacin Rash    Zyrtec [Cetirizine] Palpitations        CURRENT MEDICATIONS:  A list of medications prior to the time of admission include:  Prior to Admission medications    Medication Sig Start Date End Date Taking? Authorizing Provider   oxyCODONE-acetaminophen (PERCOCET) 5-325 mg per tablet Take 1-2 Tabs by mouth every four (4) hours as needed for Pain for up to 30 days. Max Daily Amount: 12 Tabs.  1/11/20 2/10/20 Yes Nasima Pradhan PA-C   docusate sodium (COLACE) 50 mg capsule Take 2 Caps by mouth three (3) times daily as needed for Constipation for up to 90 days. 1/11/20 4/10/20 Yes Jeremias Ron PA-C   aspirin 81 mg chewable tablet Take 1 Tab by mouth two (2) times a day. 1/11/20  Yes Jeremias Ron PA-C   montelukast (SINGULAIR) 10 mg tablet Take 10 mg by mouth as needed. Provider, Historical   fluticasone propionate (FLONASE ALLERGY RELIEF) 50 mcg/actuation nasal spray 2 Sprays by Both Nostrils route as needed for Rhinitis. Provider, Historical   omega-3 fatty acids-vitamin e (FISH OIL) 1,000 mg cap Take 1 Cap by mouth. Other, MD Shawanda   cyanocobalamin 1,000 mcg tablet Take 1,000 mcg by mouth daily. Shawanda Ventura MD   calcium-cholecalciferol, D3, (CALTRATE 600+D) tablet Take 1 tablet by mouth daily. Shawanda Ventura MD       FAMILY HISTORY: History reviewed. No pertinent family history. SOCIAL HISTORY:   Social History     Socioeconomic History    Marital status:      Spouse name: Not on file    Number of children: Not on file    Years of education: Not on file    Highest education level: Not on file   Tobacco Use    Smoking status: Never Smoker    Smokeless tobacco: Never Used   Substance and Sexual Activity    Alcohol use: Yes     Comment: rare    Drug use: No       REVIEW OF SYSTEMS: All review of systems are negative. PHYSICAL EXAMINATION: For a detailed physical exam, please refer to the patient's chart. HOSPITAL COURSE: The patient was taken to surgery the day of admission. she underwent a left total knee replacement. Operative course was benign. Estimated blood loss was approximately 50 cc. The patient was taken to the PACU in stable condition and was later taken to the floor in stable condition. During her hospital stay, the patient progressed well with physical therapy and occupational therapy, adherent to instructions. she had been cleared by physical therapy with stair training. she was placed on Aspirin for DVT prophylaxis. her pain has been well controlled with oral pain medications.   her vitals have remained stable. she has also remained hemodynamically stable. The patient has been recommended for discharge home on POD#2. DISCHARGE INSTRUCTIONS: The patient is to be discharged home with home health. she is to continue on her prior medications per the medication reconciliation form, to which we will add:  1. Ecotrin 81 mg; 1 tablet p.o. Twice daily X 6 weeks  2. Colace 100 mg po TID prn constipation  3. Percocet 5/325 mg; 1-2 tablets p.o. every 4 to 6 hours p.r.n. for pain    The patient is to continue at home with home physical therapy 3 times a week to work on gait training, range of motion, strengthening, and weightbearing exercises as tolerated on her left lower extremity. The patient is to progress from a walker to a cane to complete total weightbearing as tolerable. The patient is to continue to keep her incision dry. The patient is to followup with Dr. Roya Meek in the office approximately 1-2 weeks status post for x-rays and further evaluation.     Marilee Mccauley PA-C  1/11/20209:39 AM

## 2020-01-11 NOTE — PROGRESS NOTES
Problem: Mobility Impaired (Adult and Pediatric)  Goal: *Acute Goals and Plan of Care (Insert Text)  Description  Physical Therapy Goals   Initiated 1/11/2020 and to be accomplished within 3-5 day(s)  1. Patient will move from supine <> sit with S in prep for out of bed activity and change of position. 2.  Patient will perform sit<> stand with S with LRAD in prep for transfers/ambulation. 3.  Patient will transfer from bed <> chair with S with LRAD for time up in chair for completion of ADL activity. 4.  Patient will ambulate 150 feet with LRAD/S for improved functional mobility/safe discharge. 5.  Patient will ascend/descend 3-5 stairs with handrail with minimal assistance/contact guard assist for home re-entry as needed. 1/11/2020 1502 by Kandi Lab, PT  Outcome: Progressing Towards Goal  1/11/2020 1106 by Kandi Nye, PT  Outcome: Progressing Towards Goal   PHYSICAL THERAPY TREATMENT    Patient: Gabriela Goss (42 y.o. female)  Date: 1/11/2020  Diagnosis: Total knee replacement status, left [Z96.652]   <principal problem not specified>  Procedure(s) (LRB):  LEFT TOTAL KNEE ARTHROPLASTY AND ALL INDICATED PROCEDURES, \" SPEC POP\" (Left) 1 Day Post-Op  Precautions: Fall, WBAT   Chart, physical therapy assessment, plan of care and goals were reviewed. ASSESSMENT:  Pt seen in supine prior to session. Pt reported no pain at this time. Pt able to increase ambulation distance w/ RW/GB w/o any difficulty and no LOB noted. Pt able to perform step negotiation using HR side step technique w/o any difficulty and no LOB noted. Pt transferred back to room where pt was able to perform therex activity, pt limited secondary to pain. Pt left in supine after session, call bell and tray in reach, nurse notified after session.    Progression toward goals:  [x]      Improving appropriately and progressing toward goals  []      Improving slowly and progressing toward goals  []      Not making progress toward goals and plan of care will be adjusted     PLAN:  Patient continues to benefit from skilled intervention to address the above impairments. Continue treatment per established plan of care. Discharge Recommendations:  Home Health  Further Equipment Recommendations for Discharge:  rolling walker     SUBJECTIVE:   Patient stated Its a little more stiff than earlier.     OBJECTIVE DATA SUMMARY:   Critical Behavior:  Neurologic State: Alert, Appropriate for age  Orientation Level: Oriented X4  Cognition: Appropriate decision making, Appropriate safety awareness  Safety/Judgement: Awareness of environment, Good awareness of safety precautions  Functional Mobility Training:  Bed Mobility:  Supine to Sit: Modified independent;Supervision  Sit to Supine: Modified independent;Supervision  Scooting: Modified independent;Supervision  Transfers:  Sit to Stand: Modified independent;Supervision  Stand to Sit: Modified independent;Supervision  Balance:  Sitting: Intact  Standing: Intact; With support  Ambulation/Gait Training:  Distance (ft): 200 Feet (ft)  Assistive Device: Gait belt;Walker, rolling  Ambulation - Level of Assistance: Supervision  Gait Description (WDL): Exceptions to WDL  Gait Abnormalities: Antalgic;Decreased step clearance  Left Side Weight Bearing: As tolerated  Base of Support: Shift to right  Stance: Left decreased  Step Length: Left shortened;Right shortened  Swing Pattern: Left asymmetrical;Right asymmetrical  Stairs:  Number of Stairs Trained: 20  Stairs - Level of Assistance: Contact guard assistance  Rail Use: Left   Therapeutic Exercises:       EXERCISE   Sets   Reps   Active Active Assist   Passive Self ROM   Comments   Ankle Pumps    [] [] [] []    Quad Sets/Glut Sets 1 5 [x] [] [] [] Hold for 5 secs   Hamstring Sets   [] [] [] []    Short Arc Quads   [] [] [] []    Heel Slides 1 5 [] [] [x] []    Straight Leg Raises 1 5 [x] [] [] []    Hip Abd/Add   [] [] [] []    Long Arc Quads   [] [] [] [] Seated Marching   [] [] [] []    Standing Marching   [] [] [] []       [] [] [] []       Pain:  Pain Scale 1: Numeric (0 - 10)  Pain Intensity 1: 0  Pain Location 1: Knee  Pain Orientation 1: Left  Pain Description 1: Aching  Pain Intervention(s) 1: Cold pack  Activity Tolerance:   Good  Please refer to the flowsheet for vital signs taken during this treatment.   After treatment:   [] Patient left in no apparent distress sitting up in chair  [x] Patient left in no apparent distress in bed  [x] Call bell left within reach  [x] Nursing notified  [] Caregiver present  [] Bed alarm activated      Robert Cm PT   Time Calculation: 18 mins

## 2020-01-11 NOTE — PROGRESS NOTES
1922: Bedside report received from [facility], RN. Assumed care of pt at this time. Pt in bed with no signs of distress. Pt left with call light within reach and encouraged to call for assistance. 2010: Head to toe assessment completed at this time  Patient is A&OX4. Pt denies N/V, chest pain, SOB or distress. Pt is calm and cooperative. Capillary refill less than 3 seconds. Skin is warm and dry with mepilex dressing on elastic bandage and is clean, dry and intact. Lungs are clear bilaterally. Patient instructed on use and reason for incentive spirometer and able to do 1500 ml. Bowel sounds are active. Abdomen is soft and non-tender. ARMIN on RLE & PLEXI in place bilaterally. Positive dorsalis pedis pulse, sensation, warm. No tingling or numbness to lower extremities. 18 g needle in the right hand and is clean, dry and intact. Pain scale explained to patient. Reasons for taking PRN meds explained to patient. Patient instructed to call for prn when needed. Pain level is 3. Patient was oriented to call bell and bed function. Will continue to monitor. 2215: Patient ambulated for the first time post op. Tolerated well. No distress noted. 2225: Zofran IV given PRN. 2332: 5mg of Oxycodone given PRN. Shift summary: Patient had uneventful shift. Patient ambulated with assistance using walker. Pain remained well-controlled with medication. No issues/concerns at this time.

## 2020-01-11 NOTE — PROGRESS NOTES
Received bedside shift report from Maylin Campo RN. Patient A/OX4. Patient in bed resting quietly. No complaints of pain or nausea at this time. Discharge orders placed-Patient to discharge tomorrow 1/12/2020. Patient aware    PT in to work with patient, Patient tolerated well. 0850-Visitor present. No complaints of pain at this time. 1200-Patient ambulated to the nurses station and back to room with CNA, patient tolerated well.     1822-Patient ambulated with RN to nurses station and back to room. Patient tolerated well. SHIFT SUMMARY:  Patient did not request any pain or nausea medication throughout shift. Patient ambulated 3x during shift. Patient to discharge tomorrow. Patient Vitals for the past 12 hrs:   Temp Pulse Resp BP SpO2   01/11/20 1511 98.4 °F (36.9 °C) 77 16 112/41 98 %   01/11/20 1110 97.8 °F (36.6 °C) 61 -- 108/47 97 %   01/11/20 0743 97.8 °F (36.6 °C) 68 16 116/53 97 %     Bedside and Verbal shift change report given to Sahil Hartman RN (oncoming nurse) by Tiago Rosa RN (offgoing nurse). Report included the following information SBAR, Kardex, Intake/Output and MAR.

## 2020-01-11 NOTE — ROUTINE PROCESS
Bedside shift change report given to Melissa RN (oncoming nurse) by Chana CHOU RN (offgoing nurse). Report included the following information SBAR, Kardex and MAR.

## 2020-01-11 NOTE — PROGRESS NOTES
Progress Note        Patient: Susan Yin MRN: [id number]  SSN: [id number]    YOB: 1945  Age: 76 y.o. Sex: female      1 Day Post-Op status post Procedure(s) (LRB):  LEFT TOTAL KNEE ARTHROPLASTY AND ALL INDICATED PROCEDURES, \" SPEC POP\" (Left)    Admit Date: 1/10/2020  Admit Diagnosis: Total knee replacement status, left [Z96.652]    Subjective:       Doing well. No complaints. No SOB. No Chest Pain. No Nausea or Vomiting. No problems eating or voiding. Objective:        Temp (24hrs), Av.9 °F (36.6 °C), Min:97.6 °F (36.4 °C), Max:98.5 °F (36.9 °C)    Body mass index is 24.36 kg/m². Patient Vitals for the past 12 hrs:   BP Temp Pulse Resp SpO2   20 0743 116/53 97.8 °F (36.6 °C) 68 16 97 %   20 0409 109/52 97.6 °F (36.4 °C) 60 16 95 %   01/10/20 2326 111/50 97.7 °F (36.5 °C) 62 13 96 %     No results for input(s): HGB, HCT, INR, NA, K, CL, CO2, BUN, CREA, GLU, HGBEXT, HCTEXT, INREXT in the last 72 hours. Physical Exam:  Vital Signs are Stable. No Acute Distress. Alert and Oriented. Negative Homans sign. Toes AROM Full. Neurovascular exam is normal.    Dressing is Clean, Dry, and Intact. Assessment/Plan:     1. Patient doing well. 2. Out of BED / PT / WBAT  3. DVT ppx: Mobilization, Ecotrin, ARMIN hose, and mechanical compression  4.  D/c planning    Continue PT/OT  Continue ROM    Discharge Plan: Home with [facility] tomorrow Pending PT clearance and oral analgesia      Signed By: Jerson Gutierrez PA-C     2020

## 2020-01-11 NOTE — PROGRESS NOTES
OCCUPATIONAL THERAPY EVALUATION/DISCHARGE    Problem: Self Care Deficits Care Plan (Adult)  Goal: *Acute Goals and Plan of Care (Insert Text)  Description  Initial Occupational Therapy Goals (1/11/2020) Within 7 day(s):    1. Patient will perform perform grooming standing sink level for 5 minutes for increased independence in ADLs. 2. Patient will perform UB dressing with mod I seated EOB for increased independence with ADLs. 3. Patient will perform LB dressing with mod I & A/E PRN for increased independence with ADLs. 4. Patient will perform all aspects of toileting with mod I for increased independence with ADLs. 5. Patient will perform LE ADLs utilizing body mechanics & adaptive strategies with 1 verbal cue for increased safety in ADLs. 6. Patient will independently apply energy conservation techniques with 1 verbal cue(s) for increased independence with ADLs. Outcome: Progressing Towards Goal     Patient: Matias Hall (87 y.o. female)  Date: 1/11/2020  Primary Diagnosis: Total knee replacement status, left [Z96.652]  Procedure(s) (LRB):  LEFT TOTAL KNEE ARTHROPLASTY AND ALL INDICATED PROCEDURES, \" SPEC POP\" (Left) 1 Day Post-Op   Precautions:   Fall, WBAT  PLOF: Pt reports independence in self care at home    ASSESSMENT AND RECOMMENDATIONS:  Based on the objective data described below, the patient presents with general decrease in ADL performance. Received supine in bed  present, able to reach EOB with supervision, reports no pain today. Pt able to doff socks at EOB, don socks long sitting at bed level. Able to don sweat pants at EOB with no A, able to maintain balance when standing to pull over hips. Able to ambulate to bathroom with FWW, able to perform toilet transfer with CGA. Pt returned to bed with cargiver present.      Education: Pt educated on role of OT in acute setting, what to expect from home health therapy, technique to don/doff LB dressing    Skilled Occupational Therapy is not indicated at this time. Discharge Recommendations: Home Health  Further Equipment Recommendations for Discharge: N/A      SUBJECTIVE:   Patient stated I want home health to come work with me.     OBJECTIVE DATA SUMMARY:     Past Medical History:   Diagnosis Date    Adverse effect of anesthesia 2008    couldnt smell for 1 year after bladder surgery patient states    Arthritis     Autoimmune disease ([facility] [address]     RA    Ill-defined condition     seasonal allergies    Menopause     Palpitations 06/06/2013    History of    Rheumatoid arthritis(714.0)      Past Surgical History:   Procedure Laterality Date    HX APPENDECTOMY      HX BLADDER SUSPENSION      2008. HX KNEE ARTHROSCOPY Left 2005    HX ORTHOPAEDIC      arm surgery     Barriers to Learning/Limitations: None  Compensate with: visual, verbal, tactile, kinesthetic cues/model    Home Situation: Lives at home with , will be living on first floor until she improves to go up increased stair height  Home Situation  Home Environment: Private residence  # Steps to Enter: 4  Rails to Enter: Yes  Hand Rails : Right  One/Two Story Residence: Two story, live on 1st floor  # of Interior Steps: 15  Interior Rails: Right  Living Alone: No  Support Systems: Spouse/Significant Other/Partner  Patient Expects to be Discharged to[de-identified] Private residence  Current DME Used/Available at Home: Shower chair  Tub or Shower Type: Shower  [x]     Right hand dominant   []     Left hand dominant    Cognitive/Behavioral Status:  Neurologic State: Alert; Appropriate for age  Orientation Level: Oriented X4  Cognition: Appropriate decision making; Appropriate safety awareness  Safety/Judgement: Awareness of environment;Good awareness of safety precautions    Skin: intact, dressing in place  Edema: Noted through LE    Vision/Perceptual:    Tracking: Able to track stimulus in all quadrants w/o difficulty    Saccades: Within Defined Limits    Convergence: Normal (within 6 inches from nose) Acuity: Within Defined Limits    Corrective Lenses: Glasses    Coordination: BUE     Fine Motor Skills-Upper: Left Intact; Right Intact    Gross Motor Skills-Upper: Left Intact; Right Intact    Balance:  Sitting: Intact  Standing: Intact; With support    Strength: BUE   Right: WNL  Left WNL    Tone & Sensation: BUE   Tone: Normal  Sensation: Intact       Range of Motion: BUE   BUE WNL     Functional Mobility and Transfers for ADLs:  Bed Mobility:     Supine to Sit: Supervision  Sit to Supine: Supervision  Scooting: Supervision  Transfers:  Sit to Stand: Contact guard assistance     Toilet Transfer : Contact guard assistance      Bathroom Mobility: Modified independent    ADL Assessment:  Feeding: Modified independent    Oral Facial Hygiene/Grooming: Stand-by assistance    Bathing: Minimum assistance    Upper Body Dressing: Contact guard assistance    Lower Body Dressing: Contact guard assistance    Toileting: Contact guard assistance      ADL Intervention:     Lower Body Dressing Assistance  Dressing Assistance: Supervision  Underpants: Supervision  Pants With Elastic Waist: Supervision  Socks: Supervision  Leg Crossed Method Used: No  Position Performed: Long sitting on bed;Seated edge of bed    Cognitive Retraining  Safety/Judgement: Awareness of environment;Good awareness of safety precautions      Pain:  Pain level pre-treatment: 0/10   Pain level post-treatment: 0/10   Pain Intervention(s): Medication provided by Nursing (see MAR); Rest, Ice, Repositioning   Response to intervention: Nurse notified, See doc flow sheet    Activity Tolerance:   Good. Patient able to stand 5 minute(s). Patient able to complete ADLs without rest breaks. Patient limited by decreased ROM. Please refer to the flowsheet for vital signs taken during this treatment.   After treatment:   []  Patient left in no apparent distress sitting up in chair  [x]  Patient left in no apparent distress in bed  [x]  Call bell left within reach  [x]  Nursing notified  [x]  Caregiver present  []  Bed alarm activated    COMMUNICATION/EDUCATION:   [x]      Role of Occupational Therapy in the acute care setting  [x]      Home safety education was provided and the patient/caregiver indicated understanding. [x]      Patient/family have participated as able and agree with findings and recommendations. []      Patient is unable to participate in plan of care at this time. Thank you for this referral.  Cecile Keith OTD, OTR/L   Time Calculation: 11 mins      Eval Complexity: History: LOW Complexity : Brief history review ; Examination: LOW Complexity : 1-3 performance deficits relating to physical, cognitive , or psychosocial skils that result in activity limitations and / or participation restrictions ;    Decision Making:LOW Complexity : No comorbidities that affect functional and no verbal or physical assistance needed to complete eval tasks

## 2020-01-11 NOTE — ROUTINE PROCESS
Bedside and Verbal shift change report given to Chayo Zapata RN (oncoming nurse) by Miranda Coley RN (offgoing nurse). Report included the following information SBAR, Kardex, MAR and Recent Results.

## 2020-01-11 NOTE — PHYSICIAN ADVISORY
Letter of Status Determination:   Recommend hospitalization status upgraded from   INPATIENT  to OBSERVATION Status     Pt Name:  Susan Yin   MR#   [facility] # [id number] /  [id number]  Payor: Trina Brewster / Plan: Victor Manuel Amaya / Product Type: Medicare /    [place]#  [id number]   [address] [place] [address]  310/01  @ [facility]   Hospitalization date  1/10/2020 10:00 AM   Current Attending Physician  Dru Chapa MD   Principal diagnosis  Total knee replacement status, left [Z96.652]     Clinicals  76 y.o. y.o  female hospitalized with above diagnosis     The patient was taken to surgery the day of admission. she underwent a left total knee replacement. Operative course was benign. Estimated blood loss was approximately 50 cc. The patient was taken to the PACU in stable condition and was later taken to the floor in stable condition. [place] ([facility]) criteria   Does  NOT apply    STATUS DETERMINATION         Additional comments     Payor: Trina Brewster / Plan: Victor Manuel Amaya / Product Type: Medicare /           Emiliano Savage Physician [facility] [address]  Shane Timmons  T: [phone]    [email or web address]

## 2020-01-11 NOTE — PROGRESS NOTES
DC Plan: Discharge home tomorrow with [facility], MD follow up, family assistance    Chart reviewed as CM on call. Pt admitted to medical by MD Celine Collins. Met with pt at bedside to discuss dc plan, no friend/family present. Pt states she lives with .  will drive home at discharge. Home address confirmed per face sheet. Pt states she has follow up appt with MD Celine Collins or [place]. Pt states she will have daughter obtain a RW for her prior to discharge thru [employer] or ask  to get one. Advised pt to notify CM if walker needs to be ordered for home delivery. CM contact information provided to patient. FOC offered for [place]. Pt chose [facility]. Referral placed. No other dc concerns identified . CM will cont to follow for transition of care needs and will be available via hospital  on weekends. Transition of Care ([place]) Plan:          Pt admitted for an elective surgical procedure. Pt is independent. Please encourage ambulation. No transition of care needs identified at this time. Anticipate pt will be medically stable for discharge within the next 24-48 hours with physician follow up. CM available to assist as needed. [place] Transportation:   How is patient being transported at discharge? Family/Friend      When? Once cleared by physician     Is transport scheduled? N/A      Follow-up appointment and transportation:   PCP/Specialist?  See AVS for Appointment         Who is transporting to the follow-up appointment? Self/Family/Friend      Is transport for follow up appointment scheduled? N/A    Communication plan (with patient/family): Who is being called? Patient or Next of Kin? Responsible party? Patient      What number(s) is to be used? See Facesheet      What service provider is calling for [facility] services? When are they calling? Readmission Risk?   (Green/Low; Yellow/Moderate; Red/High):  Green    Care Management Interventions  Mode of Transport at Discharge:  Other (see comment)()  Transition of Care Consult (CM Consult): Discharge Planning, [address]: Yes  Physical Therapy Consult: Yes  Occupational Therapy Consult: Yes  Current Support Network: Lives with Spouse  Confirm Follow Up Transport: Family  The Plan for Transition of Care is Related to the Following Treatment Goals : home with [facility]  The Patient and/or Patient Representative was Provided with a Choice of Provider and Agrees with the Discharge Plan?: Yes  Name of the Patient Representative Who was Provided with a Choice of Provider and Agrees with the Discharge Plan: PATIENT  Freedom of Choice List was Provided with Basic Dialogue that Supports the Patient's Individualized Plan of Care/Goals, Treatment Preferences and Shares the Quality Data Associated with the Providers?: Yes  Discharge Location  Discharge Placement: Home with home health

## 2020-01-11 NOTE — PROGRESS NOTES
Problem: Mobility Impaired (Adult and Pediatric)  Goal: *Acute Goals and Plan of Care (Insert Text)  Description  Physical Therapy Goals   Initiated 1/11/2020 and to be accomplished within 3-5 day(s)  1. Patient will move from supine <> sit with S in prep for out of bed activity and change of position. 2.  Patient will perform sit<> stand with S with LRAD in prep for transfers/ambulation. 3.  Patient will transfer from bed <> chair with S with LRAD for time up in chair for completion of ADL activity. 4.  Patient will ambulate 150 feet with LRAD/S for improved functional mobility/safe discharge. 5.  Patient will ascend/descend 3-5 stairs with handrail with minimal assistance/contact guard assist for home re-entry as needed. Outcome: Progressing Towards Goal   PHYSICAL THERAPY EVALUATION    Patient: Leland Vargas (84 y.o. female)  Date: 1/11/2020  Primary Diagnosis: Total knee replacement status, left [Z96.652]  Procedure(s) (LRB):  LEFT TOTAL KNEE ARTHROPLASTY AND ALL INDICATED PROCEDURES, \" SPEC POP\" (Left) 1 Day Post-Op   Precautions:Fall, WBAT    ASSESSMENT :  Based on the objective data described below, the patient presents with decrease independence w/ bed mobility, transfers, gait, and step negotiation. Pt seen in supine prior to session. Pt reported 3/10 pain in L knee prior to session. Pt able to ambulate w/ RW/GB in the hallway w/o any difficulty min VCs for gait sequencing. Pt transferred back to room where pt was left sitting in upright chair after session, call bell and tray in reach, nurse notified after session. Patient will benefit from skilled intervention to address the above impairments.   Patients rehabilitation potential is considered to be Good  Factors which may influence rehabilitation potential include:   []         None noted  []         Mental ability/status  []         Medical condition  [x]         Home/family situation and support systems  [x]         Safety awareness  [x]         Pain tolerance/management  []         Other:      PLAN :  Recommendations and Planned Interventions:  [x]           Bed Mobility Training             [x]    Neuromuscular Re-Education  [x]           Transfer Training                   []    Orthotic/Prosthetic Training  [x]           Gait Training                          []    Modalities  [x]           Therapeutic Exercises          []    Edema Management/Control  [x]           Therapeutic Activities            [x]    Patient and Family Training/Education  []           Other (comment):    Frequency/Duration: Patient will be followed by physical therapy twice daily to address goals. Discharge Recommendations: Home Health  Further Equipment Recommendations for Discharge: rolling walker     SUBJECTIVE:   Patient stated I feel pretty okay right now.     OBJECTIVE DATA SUMMARY:     Past Medical History:   Diagnosis Date    Adverse effect of anesthesia 2008    couldnt smell for 1 year after bladder surgery patient states    Arthritis     Autoimmune disease ([facility] [address]     RA    Ill-defined condition     seasonal allergies    Menopause     Palpitations 06/06/2013    History of    Rheumatoid arthritis(714.0)      Past Surgical History:   Procedure Laterality Date    HX APPENDECTOMY      HX BLADDER SUSPENSION      2008.     HX KNEE ARTHROSCOPY Left 2005    HX ORTHOPAEDIC      arm surgery     Barriers to Learning/Limitations: yes;  physical  Compensate with: Verbal Cues and Tactile Cues  Prior Level of Function/Home Situation:   Home Situation  Home Environment: Private residence  # Steps to Enter: 4  Rails to Enter: Yes  Hand Rails : Right  One/Two Story Residence: Two story, live on 1st floor  # of Interior Steps: 15  Interior Rails: Right  Living Alone: No  Support Systems: Spouse/Significant Other/Partner  Patient Expects to be Discharged to[de-identified] Private residence  Current DME Used/Available at Home: Shower chair  Tub or Shower Type: Shower  Critical Behavior:  Neurologic State: Alert; Appropriate for age  Orientation Level: Oriented X4  Cognition: Appropriate decision making; Appropriate safety awareness  Safety/Judgement: Awareness of environment;Good awareness of safety precautions  Psychosocial  Patient Behaviors: Calm; Cooperative  Family  Behaviors: Appropriate for situation  Purposeful Interaction: Yes  Pt Identified Daily Priority: Clinical issues (comment)  Caritas Process: Nurture loving kindness; Teaching/learning; Attend basic human needs  Caring Interventions: Reassure  Reassure: Therapeutic listening  Therapeutic Modalities: Humor  Skin Condition/Temp: Dry;Warm  Family  Behaviors: Appropriate for situation  Skin Integrity: Incision (comment)(left knee)  Skin Integumentary  Skin Color: Appropriate for ethnicity  Skin Condition/Temp: Dry;Warm  Skin Integrity: Incision (comment)(left knee)  Turgor: Non-tenting  Hair Growth: Present  Varicosities: Absent  Strength:    Strength: Generally decreased, functional  Tone & Sensation:   Tone: Normal  Sensation: Intact  Range Of Motion:  AROM: Generally decreased, functional  Functional Mobility:  Bed Mobility:  Supine to Sit: Modified independent;Supervision  Sit to Supine: Supervision  Scooting: Modified independent;Supervision  Transfers:  Sit to Stand: Modified independent;Supervision  Stand to Sit: Modified independent;Supervision  Balance:   Sitting: Intact  Standing: Intact; With support  Ambulation/Gait Training:  Distance (ft): 120 Feet (ft)  Assistive Device: Gait belt;Walker, rolling  Ambulation - Level of Assistance: Stand-by assistance  Gait Description (WDL): Exceptions to WDL  Gait Abnormalities: Antalgic;Decreased step clearance  Left Side Weight Bearing: As tolerated  Base of Support: Shift to right  Stance: Left decreased  Step Length: Left shortened;Right shortened  Swing Pattern: Left asymmetrical;Right asymmetrical  Therapeutic Exercises:       EXERCISE   Sets   Reps   Active Active Assist Passive Self ROM   Comments   Ankle Pumps    [] [] [] []    Quad Sets/Glut Sets   [] [] [] []    Hamstring Sets   [] [] [] []    Short Arc Quads   [] [] [] []    Heel Slides 1 5 [] [] [] [x]    Straight Leg Raises   [] [] [] []    Hip Abd/Add   [] [] [] []    Long Arc Quads 1 5 [x] [] [] []    Seated Marching   [] [] [] []    Standing Marching   [] [] [] []       [] [] [] []       Pain:  Pain Scale 1: Numeric (0 - 10)  Pain Intensity 1: 2  Pain Location 1: Knee  Pain Orientation 1: Left  Pain Description 1: Aching  Pain Intervention(s) 1: Cold pack  Activity Tolerance:   Good  Please refer to the flowsheet for vital signs taken during this treatment. After treatment:   [x]         Patient left in no apparent distress sitting up in chair  []         Patient left in no apparent distress in bed  [x]         Call bell left within reach  [x]         Nursing notified  []         Caregiver present  []         Bed alarm activated    COMMUNICATION/EDUCATION:   [x]         Fall prevention education was provided and the patient/caregiver indicated understanding. [x]         Patient/family have participated as able in goal setting and plan of care. [x]         Patient/family agree to work toward stated goals and plan of care. []         Patient understands intent and goals of therapy, but is neutral about his/her participation. []         Patient is unable to participate in goal setting and plan of care.     Thank you for this referral.  Emmy Mac, PT   Time Calculation: 15 mins   Eval Complexity: History: HIGH Complexity :3+ comorbidities / personal factors will impact the outcome/ POC Exam:LOW Complexity : 1-2 Standardized tests and measures addressing body structure, function, activity limitation and / or participation in recreation  Presentation: LOW Complexity : Stable, uncomplicated  Clinical Decision Making:Low Complexity ambulate >30ft  Overall Complexity:LOW

## 2020-01-11 NOTE — DISCHARGE INSTRUCTIONS
[facility]     Patient Discharge Instructions    Son Salazar / [id number] : 1945    Admitted 1/10/2020 Discharged: 2020     IF YOU HAVE ANY PROBLEMS ONCE YOU ARE AT HOME CALL THE FOLLOWING NUMBERS:   Main office number: [phone]    Your follow up appointment to see either Dr. Urbano Cuevas is scheduled in 1-2 weeks. If you are unsure of your appointment date call the office at [phone]. Take Home Medications     · Resume your home medictions as directed  · A prescription for pain medication has been given   · It is important that you take the medication exactly as they are prescribed. · Keep your medication in the bottles provided by the pharmacist and keep a list of the medication names, dosages, and times to be taken in your wallet. · Do not take other medications without consulting your doctor. · Note:  If you have already received and/or filled a prescription for one or more of the medications you've received a prescription for when leaving the hospital, you may disguard the duplicate prescription. What to do at [address] [place] [address] Ave your prehospital diet. If you have excessive nausea or vomitting call your doctor's office     Wear portable sequential compressive devices at least 18 hours per day for 2 weeks. They may be used beyond 2 weeks as needed to help control swelling. ARMIN hose should be worn during the day - may be removed for sleep. Should be worn on both legs for 2 weeks and then on the operative extremity for a total of 6 weeks. Begin In-Home Physical Therapy; 3 times a week to work on gait training, range of motion, strengthening, and weight bearing exercises as tolerable. Continue to use your walker or cane when walking. May progress from the walker to a cane to complete total bearing as tolerable. Keep incision DRY. You may need to sponge bathe to keep the incision dry.     When to Call    - Call if you have a temperature greater then 101  - Unable to keep food down  - Are unable to bear any wieght   - Need a pain medication refill     Information obtained by :  I understand that if any problems occur once I am at home I am to contact my physician. I understand and acknowledge receipt of the instructions indicated above.                                                                                                                                            Physician's or R.N.'s Signature                                                                  Date/Time                                                                                                                                              Patient or Representative Signature                                                          Date/Time

## 2020-01-12 VITALS
OXYGEN SATURATION: 94 % | SYSTOLIC BLOOD PRESSURE: 124 MMHG | RESPIRATION RATE: 18 BRPM | HEART RATE: 78 BPM | TEMPERATURE: 98.5 F | DIASTOLIC BLOOD PRESSURE: 54 MMHG | BODY MASS INDEX: 26.38 KG/M2 | WEIGHT: 154.5 LBS | HEIGHT: 64 IN

## 2020-01-12 PROCEDURE — 74011250637 HC RX REV CODE- 250/637: Performed by: PHYSICIAN ASSISTANT

## 2020-01-12 PROCEDURE — 77030012893

## 2020-01-12 PROCEDURE — 97116 GAIT TRAINING THERAPY: CPT

## 2020-01-12 RX ADMIN — Medication 81 MG: at 08:42

## 2020-01-12 RX ADMIN — OXYCODONE HYDROCHLORIDE 10 MG: 5 TABLET ORAL at 08:42

## 2020-01-12 RX ADMIN — SENNOSIDES AND DOCUSATE SODIUM 1 TABLET: 8.6; 5 TABLET ORAL at 08:42

## 2020-01-12 RX ADMIN — ACETAMINOPHEN 1000 MG: 500 TABLET ORAL at 02:11

## 2020-01-12 RX ADMIN — FERROUS SULFATE TAB 325 MG (65 MG ELEMENTAL FE) 325 MG: 325 (65 FE) TAB at 08:42

## 2020-01-12 NOTE — PROGRESS NOTES
Bedside and Verbal shift change report given to Francisco Marcelo RN (oncoming nurse) by Juju Beasley RN (offgoing nurse). Report included the following information SBAR, Kardex, OR Summary, Intake/Output, MAR and Recent Results. 3949- Patient is 8/10 pain after PT. Patient given pain medication per orders. 1000- Ace wrap removed and mepliex changed, and ARMIN hose placed per orders. 1025- Patient discharged.

## 2020-01-12 NOTE — PROGRESS NOTES
Problem: Falls - Risk of  Goal: *Absence of Falls  Description  Document Corona Marroquin Fall Risk and appropriate interventions in the flowsheet.   Outcome: Resolved/Met  Note: Fall Risk Interventions:  Mobility Interventions: Utilize walker, cane, or other assistive device    Mentation Interventions: Adequate sleep, hydration, pain control    Medication Interventions: Teach patient to arise slowly    Elimination Interventions: Call light in reach              Problem: Patient Education: Go to Patient Education Activity  Goal: Patient/Family Education  Outcome: Resolved/Met     Problem: Pain  Goal: *Control of Pain  Outcome: Resolved/Met     Problem: Knee Replacement: Day of Surgery/Unit  Goal: Off Pathway (Use only if patient is Off Pathway)  Outcome: Resolved/Met  Goal: Activity/Safety  Outcome: Resolved/Met  Goal: Consults, if ordered  Outcome: Resolved/Met  Goal: Diagnostic Test/Procedures  Outcome: Resolved/Met  Goal: Nutrition/Diet  Outcome: Resolved/Met  Goal: Medications  Outcome: Resolved/Met  Goal: Respiratory  Outcome: Resolved/Met  Goal: Treatments/Interventions/Procedures  Outcome: Resolved/Met  Goal: Psychosocial  Outcome: Resolved/Met  Goal: *Initiate mobility  Outcome: Resolved/Met  Goal: *Optimal pain control at patient's stated goal  Outcome: Resolved/Met  Goal: *Hemodynamically stable  Outcome: Resolved/Met     Problem: Patient Education: Go to Patient Education Activity  Goal: Patient/Family Education  Outcome: Resolved/Met

## 2020-01-12 NOTE — PROGRESS NOTES
1920: Assessment completed at this time, pt had just ambulated in hallways, requesting pain medication. Pt's lungs clear bilaterally, slightly diminished at the tops. Dressing CDI, given ice new ice packs. Patient Vitals for the past 12 hrs:   Temp Pulse Resp BP SpO2   01/12/20 0327 98 °F (36.7 °C) 85 18 131/58 92 %   01/11/20 2257 98.1 °F (36.7 °C) 83 18 125/51 91 %   01/11/20 2014 -- -- -- -- 90 %   01/11/20 1858 98 °F (36.7 °C) 80 18 125/55 90 %     Bedside and Verbal shift change report given to [address] [place] [address] Griselda Antoine (oncoming nurse) by Anastasiya Anthony RN (offgoing nurse). Report included the following information SBAR, Kardex, Procedure Summary, Intake/Output, MAR and Recent Results.

## 2020-01-12 NOTE — PROGRESS NOTES
Problem: Mobility Impaired (Adult and Pediatric)  Goal: *Acute Goals and Plan of Care (Insert Text)  Description  Physical Therapy Goals   Initiated 1/11/2020 and to be accomplished within 3-5 day(s)  1. Patient will move from supine <> sit with S in prep for out of bed activity and change of position. 2.  Patient will perform sit<> stand with S with LRAD in prep for transfers/ambulation. 3.  Patient will transfer from bed <> chair with S with LRAD for time up in chair for completion of ADL activity. 4.  Patient will ambulate 150 feet with LRAD/S for improved functional mobility/safe discharge. 5.  Patient will ascend/descend 3-5 stairs with handrail with minimal assistance/contact guard assist for home re-entry as needed. Outcome: Resolved/Met   PHYSICAL THERAPY TREATMENT/DISCHARGE    Patient: Johnathan Mckeon (27 y.o. female)  Date: 1/12/2020  Diagnosis: Total knee replacement status, left [Z96.652]   <principal problem not specified>  Procedure(s) (LRB):  LEFT TOTAL KNEE ARTHROPLASTY AND ALL INDICATED PROCEDURES, \" SPEC POP\" (Left) 2 Days Post-Op  Precautions: Fall, WBAT  Chart, physical therapy assessment, plan of care and goals were reviewed. ASSESSMENT:  Pt seen in supine prior to session. Pt reported 8/10 pain, nurse made aware and was administered pain meds after session. Pt has met all goals at this time. Pt educated on steps last session pt reports she understands the sequencing. Pt educated on each therex activity w/o any difficulty and min VCs. Pt left in supine after session, call bell and tray in reach, nurse notified after session. DC from acute PT. Progression toward goals:  [x]      Goals met  []      Improving appropriately and progressing toward goals  []      Improving slowly and progressing toward goals  []      Not making progress toward goals and plan of care will be adjusted     PLAN:  Patient will be discharged from physical therapy at this time.   Rationale for discharge:  [x] Goals Achieved  [] [address]  [] Patient not participating in therapy  [] Other:  Discharge Recommendations:  Home Health  Further Equipment Recommendations for Discharge:  rolling walker     SUBJECTIVE:   Patient stated I'm having more pain right now.     OBJECTIVE DATA SUMMARY:   Critical Behavior:  Neurologic State: Alert, Appropriate for age  Orientation Level: Oriented X4  Cognition: Appropriate decision making, Appropriate safety awareness  Safety/Judgement: Awareness of environment, Good awareness of safety precautions  Functional Mobility Training:  Bed Mobility:  Supine to Sit: Modified independent;Supervision  Sit to Supine: Modified independent;Supervision  Scooting: Modified independent;Supervision  Transfers:  Sit to Stand: Modified independent;Supervision  Stand to Sit: Modified independent;Supervision  Balance:  Sitting: Intact  Standing: Intact; With support  Ambulation/Gait Training:  Distance (ft): 150 Feet (ft)  Assistive Device: Gait belt;Walker, rolling  Ambulation - Level of Assistance: Supervision  Gait Abnormalities: Antalgic;Decreased step clearance  Left Side Weight Bearing: As tolerated  Base of Support: Shift to right  Stance: Left decreased  Step Length: Left shortened;Right shortened  Swing Pattern: Left asymmetrical;Right asymmetrical  Therapeutic Exercises:   Pt educated on all therex activity of the knee and pt able to perform each task. Pain:  Pain Scale 1: Numeric (0 - 10)  Pain Intensity 1: 3  Pain Location 1: Knee  Pain Orientation 1: Right  Pain Description 1: Sore  Pain Intervention(s) 1: Medication (see MAR)  Activity Tolerance:   Good  Please refer to the flowsheet for vital signs taken during this treatment.   After treatment:   [] Patient left in no apparent distress sitting up in chair  [x] Patient left in no apparent distress in bed  [x] Call bell left within reach  [x] Nursing notified  [] Caregiver present  [] Bed alarm activated  Zarina Mesa Neena Denver, PT   Time Calculation: 20 mins

## 2020-01-13 ENCOUNTER — HOME CARE VISIT (OUTPATIENT)
Dept: HOME HEALTH SERVICES | Facility: HOME HEALTH | Age: 75
End: 2020-01-13

## 2020-01-13 ENCOUNTER — HOME CARE VISIT (OUTPATIENT)
Dept: SCHEDULING | Facility: HOME HEALTH | Age: 75
End: 2020-01-13
Payer: MEDICARE

## 2020-01-13 VITALS
HEIGHT: 64 IN | BODY MASS INDEX: 24.24 KG/M2 | HEART RATE: 87 BPM | DIASTOLIC BLOOD PRESSURE: 65 MMHG | SYSTOLIC BLOOD PRESSURE: 123 MMHG | WEIGHT: 142 LBS | TEMPERATURE: 98.4 F | OXYGEN SATURATION: 97 % | RESPIRATION RATE: 16 BRPM

## 2020-01-13 VITALS
RESPIRATION RATE: 14 BRPM | HEART RATE: 74 BPM | TEMPERATURE: 100.6 F | SYSTOLIC BLOOD PRESSURE: 125 MMHG | DIASTOLIC BLOOD PRESSURE: 64 MMHG | OXYGEN SATURATION: 95 %

## 2020-01-13 PROCEDURE — G0299 HHS/HOSPICE OF RN EA 15 MIN: HCPCS

## 2020-01-13 PROCEDURE — 3331090001 HH PPS REVENUE CREDIT

## 2020-01-13 PROCEDURE — 3331090002 HH PPS REVENUE DEBIT

## 2020-01-13 PROCEDURE — G0151 HHCP-SERV OF PT,EA 15 MIN: HCPCS

## 2020-01-13 PROCEDURE — 400013 HH SOC

## 2020-01-14 ENCOUNTER — HOME CARE VISIT (OUTPATIENT)
Dept: HOME HEALTH SERVICES | Facility: HOME HEALTH | Age: 75
End: 2020-01-14
Payer: MEDICARE

## 2020-01-14 PROCEDURE — 3331090002 HH PPS REVENUE DEBIT

## 2020-01-14 PROCEDURE — 3331090001 HH PPS REVENUE CREDIT

## 2020-01-15 ENCOUNTER — HOME CARE VISIT (OUTPATIENT)
Dept: SCHEDULING | Facility: HOME HEALTH | Age: 75
End: 2020-01-15
Payer: MEDICARE

## 2020-01-15 PROCEDURE — G0157 HHC PT ASSISTANT EA 15: HCPCS

## 2020-01-15 PROCEDURE — 3331090001 HH PPS REVENUE CREDIT

## 2020-01-15 PROCEDURE — 3331090002 HH PPS REVENUE DEBIT

## 2020-01-16 ENCOUNTER — HOME CARE VISIT (OUTPATIENT)
Dept: SCHEDULING | Facility: HOME HEALTH | Age: 75
End: 2020-01-16
Payer: MEDICARE

## 2020-01-16 VITALS
DIASTOLIC BLOOD PRESSURE: 64 MMHG | SYSTOLIC BLOOD PRESSURE: 115 MMHG | HEART RATE: 92 BPM | OXYGEN SATURATION: 94 % | RESPIRATION RATE: 14 BRPM | TEMPERATURE: 96.9 F

## 2020-01-16 PROCEDURE — 3331090002 HH PPS REVENUE DEBIT

## 2020-01-16 PROCEDURE — 3331090001 HH PPS REVENUE CREDIT

## 2020-01-16 PROCEDURE — G0495 RN CARE TRAIN/EDU IN HH: HCPCS

## 2020-01-16 PROCEDURE — G0152 HHCP-SERV OF OT,EA 15 MIN: HCPCS

## 2020-01-17 ENCOUNTER — HOME CARE VISIT (OUTPATIENT)
Dept: SCHEDULING | Facility: HOME HEALTH | Age: 75
End: 2020-01-17
Payer: MEDICARE

## 2020-01-17 VITALS — DIASTOLIC BLOOD PRESSURE: 68 MMHG | SYSTOLIC BLOOD PRESSURE: 120 MMHG | TEMPERATURE: 98.6 F | OXYGEN SATURATION: 98 %

## 2020-01-17 PROCEDURE — G0157 HHC PT ASSISTANT EA 15: HCPCS

## 2020-01-17 PROCEDURE — 3331090002 HH PPS REVENUE DEBIT

## 2020-01-17 PROCEDURE — 3331090001 HH PPS REVENUE CREDIT

## 2020-01-18 PROCEDURE — 3331090001 HH PPS REVENUE CREDIT

## 2020-01-18 PROCEDURE — 3331090002 HH PPS REVENUE DEBIT

## 2020-01-19 PROCEDURE — 3331090001 HH PPS REVENUE CREDIT

## 2020-01-19 PROCEDURE — 3331090002 HH PPS REVENUE DEBIT

## 2020-01-20 ENCOUNTER — HOME CARE VISIT (OUTPATIENT)
Dept: SCHEDULING | Facility: HOME HEALTH | Age: 75
End: 2020-01-20
Payer: MEDICARE

## 2020-01-20 VITALS
HEART RATE: 77 BPM | OXYGEN SATURATION: 90 % | SYSTOLIC BLOOD PRESSURE: 130 MMHG | TEMPERATURE: 100.8 F | DIASTOLIC BLOOD PRESSURE: 61 MMHG | HEART RATE: 90 BPM | OXYGEN SATURATION: 98 % | DIASTOLIC BLOOD PRESSURE: 72 MMHG | SYSTOLIC BLOOD PRESSURE: 105 MMHG | TEMPERATURE: 99.5 F

## 2020-01-20 PROCEDURE — 3331090001 HH PPS REVENUE CREDIT

## 2020-01-20 PROCEDURE — G0157 HHC PT ASSISTANT EA 15: HCPCS

## 2020-01-20 PROCEDURE — 3331090002 HH PPS REVENUE DEBIT

## 2020-01-21 ENCOUNTER — HOME CARE VISIT (OUTPATIENT)
Dept: SCHEDULING | Facility: HOME HEALTH | Age: 75
End: 2020-01-21
Payer: MEDICARE

## 2020-01-21 PROCEDURE — 3331090001 HH PPS REVENUE CREDIT

## 2020-01-21 PROCEDURE — G0299 HHS/HOSPICE OF RN EA 15 MIN: HCPCS

## 2020-01-21 PROCEDURE — 3331090002 HH PPS REVENUE DEBIT

## 2020-01-22 ENCOUNTER — HOME CARE VISIT (OUTPATIENT)
Dept: SCHEDULING | Facility: HOME HEALTH | Age: 75
End: 2020-01-22
Payer: MEDICARE

## 2020-01-22 VITALS
OXYGEN SATURATION: 97 % | DIASTOLIC BLOOD PRESSURE: 64 MMHG | SYSTOLIC BLOOD PRESSURE: 115 MMHG | SYSTOLIC BLOOD PRESSURE: 114 MMHG | HEART RATE: 83 BPM | OXYGEN SATURATION: 97 % | HEART RATE: 92 BPM | DIASTOLIC BLOOD PRESSURE: 66 MMHG | TEMPERATURE: 99.7 F

## 2020-01-22 VITALS
DIASTOLIC BLOOD PRESSURE: 71 MMHG | RESPIRATION RATE: 14 BRPM | SYSTOLIC BLOOD PRESSURE: 109 MMHG | HEART RATE: 90 BPM | OXYGEN SATURATION: 97 % | TEMPERATURE: 97.3 F

## 2020-01-22 PROCEDURE — 3331090001 HH PPS REVENUE CREDIT

## 2020-01-22 PROCEDURE — G0157 HHC PT ASSISTANT EA 15: HCPCS

## 2020-01-22 PROCEDURE — 3331090002 HH PPS REVENUE DEBIT

## 2020-01-23 ENCOUNTER — HOME CARE VISIT (OUTPATIENT)
Dept: SCHEDULING | Facility: HOME HEALTH | Age: 75
End: 2020-01-23
Payer: MEDICARE

## 2020-01-23 VITALS
DIASTOLIC BLOOD PRESSURE: 75 MMHG | TEMPERATURE: 98.5 F | HEART RATE: 76 BPM | RESPIRATION RATE: 16 BRPM | SYSTOLIC BLOOD PRESSURE: 132 MMHG | OXYGEN SATURATION: 98 %

## 2020-01-23 PROCEDURE — 3331090002 HH PPS REVENUE DEBIT

## 2020-01-23 PROCEDURE — 3331090001 HH PPS REVENUE CREDIT

## 2020-01-23 PROCEDURE — G0151 HHCP-SERV OF PT,EA 15 MIN: HCPCS

## 2020-01-24 PROCEDURE — 3331090001 HH PPS REVENUE CREDIT

## 2020-01-24 PROCEDURE — 3331090002 HH PPS REVENUE DEBIT

## 2020-01-25 PROCEDURE — 3331090001 HH PPS REVENUE CREDIT

## 2020-01-25 PROCEDURE — 3331090002 HH PPS REVENUE DEBIT

## 2020-06-25 ENCOUNTER — HOSPITAL ENCOUNTER (OUTPATIENT)
Dept: GENERAL RADIOLOGY | Age: 75
Discharge: HOME | End: 2020-06-25
Payer: MEDICARE

## 2020-06-25 ENCOUNTER — HOSPITAL ENCOUNTER (OUTPATIENT)
Dept: PREADMISSION TESTING | Age: 75
Discharge: HOME | End: 2020-06-25
Payer: MEDICARE

## 2020-06-25 DIAGNOSIS — M06.9 ATROPHIC ARTHRITIS (HCC): ICD-10-CM

## 2020-06-25 LAB
ALBUMIN SERPL-MCNC: 3.6 G/DL (ref 3.4–5)
ALBUMIN/GLOB SERPL: 0.9 {RATIO} (ref 0.8–1.7)
ALP SERPL-CCNC: 87 U/L (ref 45–117)
ALT SERPL-CCNC: 36 U/L (ref 13–56)
AMORPH CRY URNS QL MICRO: ABNORMAL
ANION GAP SERPL CALC-SCNC: 5 MMOL/L (ref 3–18)
APPEARANCE UR: ABNORMAL
APTT PPP: 31 SEC (ref 23–36.4)
AST SERPL-CCNC: 29 U/L (ref 10–38)
ATRIAL RATE: 68 BPM
BACTERIA URNS QL MICRO: ABNORMAL /HPF
BASOPHILS # BLD: 0 K/UL (ref 0–0.1)
BASOPHILS NFR BLD: 0 % (ref 0–2)
BILIRUB SERPL-MCNC: 0.4 MG/DL (ref 0.2–1)
BILIRUB UR QL: NEGATIVE
BUN SERPL-MCNC: 17 MG/DL (ref 7–18)
BUN/CREAT SERPL: 29 (ref 12–20)
CALCIUM SERPL-MCNC: 8.8 MG/DL (ref 8.5–10.1)
CALCULATED P AXIS, ECG09: 49 DEGREES
CALCULATED R AXIS, ECG10: 76 DEGREES
CALCULATED T AXIS, ECG11: 79 DEGREES
CHLORIDE SERPL-SCNC: 107 MMOL/L (ref 100–111)
CO2 SERPL-SCNC: 25 MMOL/L (ref 21–32)
COLOR UR: YELLOW
CREAT SERPL-MCNC: 0.58 MG/DL (ref 0.6–1.3)
DIAGNOSIS, 93000: NORMAL
DIFFERENTIAL METHOD BLD: ABNORMAL
EOSINOPHIL # BLD: 0.1 K/UL (ref 0–0.4)
EOSINOPHIL NFR BLD: 2 % (ref 0–5)
EPITH CASTS URNS QL MICRO: ABNORMAL /LPF (ref 0–5)
ERYTHROCYTE [DISTWIDTH] IN BLOOD BY AUTOMATED COUNT: 12.5 % (ref 11.6–14.5)
GLOBULIN SER CALC-MCNC: 3.8 G/DL (ref 2–4)
GLUCOSE SERPL-MCNC: 93 MG/DL (ref 74–99)
GLUCOSE UR STRIP.AUTO-MCNC: NEGATIVE MG/DL
HCT VFR BLD AUTO: 39.4 % (ref 35–45)
HGB BLD-MCNC: 13 G/DL (ref 12–16)
HGB UR QL STRIP: NEGATIVE
INR PPP: 1 (ref 0.8–1.2)
KETONES UR QL STRIP.AUTO: NEGATIVE MG/DL
LEUKOCYTE ESTERASE UR QL STRIP.AUTO: ABNORMAL
LYMPHOCYTES # BLD: 2.3 K/UL (ref 0.9–3.6)
LYMPHOCYTES NFR BLD: 46 % (ref 21–52)
MCH RBC QN AUTO: 31.4 PG (ref 24–34)
MCHC RBC AUTO-ENTMCNC: 33 G/DL (ref 31–37)
MCV RBC AUTO: 95.2 FL (ref 74–97)
MONOCYTES # BLD: 0.5 K/UL (ref 0.05–1.2)
MONOCYTES NFR BLD: 10 % (ref 3–10)
NEUTS SEG # BLD: 2.1 K/UL (ref 1.8–8)
NEUTS SEG NFR BLD: 42 % (ref 40–73)
NITRITE UR QL STRIP.AUTO: NEGATIVE
P-R INTERVAL, ECG05: 154 MS
PH UR STRIP: 8 [PH] (ref 5–8)
PLATELET # BLD AUTO: 310 K/UL (ref 135–420)
PMV BLD AUTO: 10 FL (ref 9.2–11.8)
POTASSIUM SERPL-SCNC: 4.4 MMOL/L (ref 3.5–5.5)
PROT SERPL-MCNC: 7.4 G/DL (ref 6.4–8.2)
PROT UR STRIP-MCNC: NEGATIVE MG/DL
PROTHROMBIN TIME: 12.7 SEC (ref 11.5–15.2)
Q-T INTERVAL, ECG07: 420 MS
QRS DURATION, ECG06: 84 MS
QTC CALCULATION (BEZET), ECG08: 446 MS
RBC # BLD AUTO: 4.14 M/UL (ref 4.2–5.3)
RBC #/AREA URNS HPF: ABNORMAL /HPF (ref 0–5)
SODIUM SERPL-SCNC: 137 MMOL/L (ref 136–145)
SP GR UR REFRACTOMETRY: 1.02 (ref 1–1.03)
UROBILINOGEN UR QL STRIP.AUTO: 0.2 EU/DL (ref 0.2–1)
VENTRICULAR RATE, ECG03: 68 BPM
WBC # BLD AUTO: 5 K/UL (ref 4.6–13.2)
WBC URNS QL MICRO: ABNORMAL /HPF (ref 0–5)

## 2020-06-25 PROCEDURE — 36415 COLL VENOUS BLD VENIPUNCTURE: CPT

## 2020-06-25 PROCEDURE — 81001 URINALYSIS AUTO W/SCOPE: CPT

## 2020-06-25 PROCEDURE — 80053 COMPREHEN METABOLIC PANEL: CPT

## 2020-06-25 PROCEDURE — 71045 X-RAY EXAM CHEST 1 VIEW: CPT

## 2020-06-25 PROCEDURE — 87086 URINE CULTURE/COLONY COUNT: CPT

## 2020-06-25 PROCEDURE — 85730 THROMBOPLASTIN TIME PARTIAL: CPT

## 2020-06-25 PROCEDURE — 85610 PROTHROMBIN TIME: CPT

## 2020-06-25 PROCEDURE — 93005 ELECTROCARDIOGRAM TRACING: CPT

## 2020-06-25 PROCEDURE — 85025 COMPLETE CBC W/AUTO DIFF WBC: CPT

## 2020-06-26 LAB
BACTERIA SPEC CULT: NORMAL
SERVICE CMNT-IMP: NORMAL
SERVICE CMNT-IMP: NORMAL

## 2020-07-09 ENCOUNTER — HOSPITAL ENCOUNTER (OUTPATIENT)
Dept: PREADMISSION TESTING | Age: 75
Discharge: HOME | End: 2020-07-09
Payer: MEDICARE

## 2020-07-09 PROCEDURE — 87635 SARS-COV-2 COVID-19 AMP PRB: CPT

## 2020-07-11 LAB — SARS-COV-2, COV2NT: NOT DETECTED

## 2020-07-15 ENCOUNTER — ANESTHESIA EVENT (OUTPATIENT)
Dept: SURGERY | Age: 75
End: 2020-07-15
Payer: MEDICARE

## 2020-07-15 ENCOUNTER — HOSPITAL ENCOUNTER (OUTPATIENT)
Age: 75
Setting detail: OBSERVATION
Discharge: HOME HEALTH | End: 2020-07-16
Attending: ORTHOPAEDIC SURGERY | Admitting: ORTHOPAEDIC SURGERY
Payer: MEDICARE

## 2020-07-15 ENCOUNTER — ANESTHESIA (OUTPATIENT)
Dept: SURGERY | Age: 75
End: 2020-07-15
Payer: MEDICARE

## 2020-07-15 ENCOUNTER — APPOINTMENT (OUTPATIENT)
Dept: GENERAL RADIOLOGY | Age: 75
End: 2020-07-15
Attending: ORTHOPAEDIC SURGERY
Payer: MEDICARE

## 2020-07-15 DIAGNOSIS — Z96.651 HISTORY OF TOTAL KNEE ARTHROPLASTY, RIGHT: Primary | ICD-10-CM

## 2020-07-15 LAB
ABO + RH BLD: NORMAL
BLOOD GROUP ANTIBODIES SERPL: NORMAL
SPECIMEN EXP DATE BLD: NORMAL

## 2020-07-15 PROCEDURE — 36415 COLL VENOUS BLD VENIPUNCTURE: CPT

## 2020-07-15 PROCEDURE — 86900 BLOOD TYPING SEROLOGIC ABO: CPT

## 2020-07-15 PROCEDURE — 77030000032 HC CUF TRNQT ZIMM -B: Performed by: ORTHOPAEDIC SURGERY

## 2020-07-15 PROCEDURE — 77030040815: Performed by: ORTHOPAEDIC SURGERY

## 2020-07-15 PROCEDURE — 74011000250 HC RX REV CODE- 250: Performed by: ORTHOPAEDIC SURGERY

## 2020-07-15 PROCEDURE — C1776 JOINT DEVICE (IMPLANTABLE): HCPCS | Performed by: ORTHOPAEDIC SURGERY

## 2020-07-15 PROCEDURE — 76942 ECHO GUIDE FOR BIOPSY: CPT | Performed by: ORTHOPAEDIC SURGERY

## 2020-07-15 PROCEDURE — 77030034479 HC ADH SKN CLSR PRINEO J&J -B: Performed by: ORTHOPAEDIC SURGERY

## 2020-07-15 PROCEDURE — 77030011628: Performed by: ORTHOPAEDIC SURGERY

## 2020-07-15 PROCEDURE — 97116 GAIT TRAINING THERAPY: CPT

## 2020-07-15 PROCEDURE — 77030018842 HC SOL IRR SOD CL 9% BAXT -A: Performed by: ORTHOPAEDIC SURGERY

## 2020-07-15 PROCEDURE — 97161 PT EVAL LOW COMPLEX 20 MIN: CPT

## 2020-07-15 PROCEDURE — 77030020782 HC GWN BAIR PAWS FLX 3M -B: Performed by: ORTHOPAEDIC SURGERY

## 2020-07-15 PROCEDURE — 76210000006 HC OR PH I REC 0.5 TO 1 HR: Performed by: ORTHOPAEDIC SURGERY

## 2020-07-15 PROCEDURE — 65270000029 HC RM PRIVATE

## 2020-07-15 PROCEDURE — 77030002933 HC SUT MCRYL J&J -A: Performed by: ORTHOPAEDIC SURGERY

## 2020-07-15 PROCEDURE — 74011250636 HC RX REV CODE- 250/636: Performed by: ORTHOPAEDIC SURGERY

## 2020-07-15 PROCEDURE — 65390000012 HC CONDITION CODE 44 OBSERVATION

## 2020-07-15 PROCEDURE — 77010033678 HC OXYGEN DAILY

## 2020-07-15 PROCEDURE — 77030038010: Performed by: ORTHOPAEDIC SURGERY

## 2020-07-15 PROCEDURE — 74011250637 HC RX REV CODE- 250/637: Performed by: ORTHOPAEDIC SURGERY

## 2020-07-15 PROCEDURE — 77030020268 HC MISC GENERAL SUPPLY: Performed by: ORTHOPAEDIC SURGERY

## 2020-07-15 PROCEDURE — 74011250636 HC RX REV CODE- 250/636: Performed by: STUDENT IN AN ORGANIZED HEALTH CARE EDUCATION/TRAINING PROGRAM

## 2020-07-15 PROCEDURE — C1713 ANCHOR/SCREW BN/BN,TIS/BN: HCPCS | Performed by: ORTHOPAEDIC SURGERY

## 2020-07-15 PROCEDURE — C9290 INJ, BUPIVACAINE LIPOSOME: HCPCS | Performed by: ORTHOPAEDIC SURGERY

## 2020-07-15 PROCEDURE — 76060000035 HC ANESTHESIA 2 TO 2.5 HR: Performed by: ORTHOPAEDIC SURGERY

## 2020-07-15 PROCEDURE — 77030033067 HC SUT PDO STRATFX SPIR J&J -B: Performed by: ORTHOPAEDIC SURGERY

## 2020-07-15 PROCEDURE — 76010000131 HC OR TIME 2 TO 2.5 HR: Performed by: ORTHOPAEDIC SURGERY

## 2020-07-15 PROCEDURE — 74011000250 HC RX REV CODE- 250: Performed by: STUDENT IN AN ORGANIZED HEALTH CARE EDUCATION/TRAINING PROGRAM

## 2020-07-15 PROCEDURE — 77030013708 HC HNDPC SUC IRR PULS STRY –B: Performed by: ORTHOPAEDIC SURGERY

## 2020-07-15 PROCEDURE — 74011000258 HC RX REV CODE- 258: Performed by: ORTHOPAEDIC SURGERY

## 2020-07-15 PROCEDURE — 73560 X-RAY EXAM OF KNEE 1 OR 2: CPT

## 2020-07-15 PROCEDURE — 77030027138 HC INCENT SPIROMETER -A: Performed by: ORTHOPAEDIC SURGERY

## 2020-07-15 PROCEDURE — 77030031139 HC SUT VCRL2 J&J -A: Performed by: ORTHOPAEDIC SURGERY

## 2020-07-15 PROCEDURE — 77030040361 HC SLV COMPR DVT MDII -B: Performed by: ORTHOPAEDIC SURGERY

## 2020-07-15 PROCEDURE — 77030012893

## 2020-07-15 DEVICE — INSERT TIB SZ 4 THK5MM KNEE POST STBL ROT PLATFRM ATTUNE: Type: IMPLANTABLE DEVICE | Site: KNEE | Status: FUNCTIONAL

## 2020-07-15 DEVICE — COMPONENT PAT DIA35MM KNEE POLY CEM MEDIALIZED ANAT ATTUNE: Type: IMPLANTABLE DEVICE | Site: KNEE | Status: FUNCTIONAL

## 2020-07-15 DEVICE — COMPONENT FEM SZ 4 R KNEE NAR POST STBL CEM ATTUNE: Type: IMPLANTABLE DEVICE | Site: KNEE | Status: FUNCTIONAL

## 2020-07-15 DEVICE — CEMENT BNE 40GM FULL DOSE PMMA W/O ANTIBIO HI VISC N RADPQ: Type: IMPLANTABLE DEVICE | Site: KNEE | Status: FUNCTIONAL

## 2020-07-15 DEVICE — CEMENT BNE 40GM FULL DOSE PMMA W/ GENT HI VISC RADPQ LNG: Type: IMPLANTABLE DEVICE | Site: KNEE | Status: FUNCTIONAL

## 2020-07-15 RX ORDER — SODIUM CHLORIDE 0.9 % (FLUSH) 0.9 %
5-40 SYRINGE (ML) INJECTION EVERY 8 HOURS
Status: DISCONTINUED | OUTPATIENT
Start: 2020-07-15 | End: 2020-07-16 | Stop reason: HOSPADM

## 2020-07-15 RX ORDER — SODIUM CHLORIDE 0.9 % (FLUSH) 0.9 %
5-40 SYRINGE (ML) INJECTION AS NEEDED
Status: DISCONTINUED | OUTPATIENT
Start: 2020-07-15 | End: 2020-07-16 | Stop reason: SDUPTHER

## 2020-07-15 RX ORDER — LANOLIN ALCOHOL/MO/W.PET/CERES
400 CREAM (GRAM) TOPICAL DAILY
Status: DISCONTINUED | OUTPATIENT
Start: 2020-07-16 | End: 2020-07-16 | Stop reason: HOSPADM

## 2020-07-15 RX ORDER — ASPIRIN 81 MG/1
81 TABLET ORAL 2 TIMES DAILY
Status: DISCONTINUED | OUTPATIENT
Start: 2020-07-15 | End: 2020-07-16 | Stop reason: HOSPADM

## 2020-07-15 RX ORDER — DIPHENHYDRAMINE HCL 25 MG
25 CAPSULE ORAL
Status: DISCONTINUED | OUTPATIENT
Start: 2020-07-15 | End: 2020-07-16 | Stop reason: HOSPADM

## 2020-07-15 RX ORDER — MONTELUKAST SODIUM 10 MG/1
10 TABLET ORAL AS NEEDED
Status: DISCONTINUED | OUTPATIENT
Start: 2020-07-15 | End: 2020-07-16 | Stop reason: HOSPADM

## 2020-07-15 RX ORDER — OXYCODONE HYDROCHLORIDE 5 MG/1
5 TABLET ORAL
Status: DISCONTINUED | OUTPATIENT
Start: 2020-07-15 | End: 2020-07-16 | Stop reason: HOSPADM

## 2020-07-15 RX ORDER — FERROUS SULFATE, DRIED 160(50) MG
1 TABLET, EXTENDED RELEASE ORAL DAILY
Status: DISCONTINUED | OUTPATIENT
Start: 2020-07-16 | End: 2020-07-16 | Stop reason: HOSPADM

## 2020-07-15 RX ORDER — ACETAMINOPHEN 500 MG
1000 TABLET ORAL EVERY 8 HOURS
Status: DISCONTINUED | OUTPATIENT
Start: 2020-07-15 | End: 2020-07-16 | Stop reason: HOSPADM

## 2020-07-15 RX ORDER — SODIUM CHLORIDE, SODIUM LACTATE, POTASSIUM CHLORIDE, CALCIUM CHLORIDE 600; 310; 30; 20 MG/100ML; MG/100ML; MG/100ML; MG/100ML
125 INJECTION, SOLUTION INTRAVENOUS CONTINUOUS
Status: DISCONTINUED | OUTPATIENT
Start: 2020-07-15 | End: 2020-07-16 | Stop reason: HOSPADM

## 2020-07-15 RX ORDER — NALOXONE HYDROCHLORIDE 0.4 MG/ML
0.4 INJECTION, SOLUTION INTRAMUSCULAR; INTRAVENOUS; SUBCUTANEOUS AS NEEDED
Status: DISCONTINUED | OUTPATIENT
Start: 2020-07-15 | End: 2020-07-16 | Stop reason: HOSPADM

## 2020-07-15 RX ORDER — CEFAZOLIN SODIUM 2 G/50ML
2 SOLUTION INTRAVENOUS EVERY 8 HOURS
Status: COMPLETED | OUTPATIENT
Start: 2020-07-15 | End: 2020-07-16

## 2020-07-15 RX ORDER — OXYCODONE HYDROCHLORIDE 5 MG/1
10 TABLET ORAL
Status: DISCONTINUED | OUTPATIENT
Start: 2020-07-15 | End: 2020-07-16 | Stop reason: HOSPADM

## 2020-07-15 RX ORDER — KETOROLAC TROMETHAMINE 30 MG/ML
15 INJECTION, SOLUTION INTRAMUSCULAR; INTRAVENOUS EVERY 6 HOURS
Status: DISCONTINUED | OUTPATIENT
Start: 2020-07-15 | End: 2020-07-16 | Stop reason: HOSPADM

## 2020-07-15 RX ORDER — BUPIVACAINE HYDROCHLORIDE 7.5 MG/ML
INJECTION, SOLUTION INTRASPINAL
Status: COMPLETED | OUTPATIENT
Start: 2020-07-15 | End: 2020-07-15

## 2020-07-15 RX ORDER — ASPIRIN 81 MG/1
81 TABLET ORAL DAILY
Status: ON HOLD | COMMUNITY
End: 2020-07-16 | Stop reason: SDUPTHER

## 2020-07-15 RX ORDER — LIDOCAINE HYDROCHLORIDE 10 MG/ML
INJECTION, SOLUTION EPIDURAL; INFILTRATION; INTRACAUDAL; PERINEURAL
Status: COMPLETED | OUTPATIENT
Start: 2020-07-15 | End: 2020-07-15

## 2020-07-15 RX ORDER — LORAZEPAM 0.5 MG/1
0.5 TABLET ORAL
Status: DISCONTINUED | OUTPATIENT
Start: 2020-07-15 | End: 2020-07-16 | Stop reason: HOSPADM

## 2020-07-15 RX ORDER — CEFAZOLIN SODIUM 2 G/50ML
2 SOLUTION INTRAVENOUS ONCE
Status: COMPLETED | OUTPATIENT
Start: 2020-07-15 | End: 2020-07-15

## 2020-07-15 RX ORDER — SODIUM CHLORIDE 0.9 % (FLUSH) 0.9 %
5-40 SYRINGE (ML) INJECTION EVERY 8 HOURS
Status: DISCONTINUED | OUTPATIENT
Start: 2020-07-15 | End: 2020-07-16 | Stop reason: SDUPTHER

## 2020-07-15 RX ORDER — FENTANYL CITRATE 50 UG/ML
INJECTION, SOLUTION INTRAMUSCULAR; INTRAVENOUS
Status: COMPLETED | OUTPATIENT
Start: 2020-07-15 | End: 2020-07-15

## 2020-07-15 RX ORDER — SODIUM CHLORIDE, SODIUM LACTATE, POTASSIUM CHLORIDE, CALCIUM CHLORIDE 600; 310; 30; 20 MG/100ML; MG/100ML; MG/100ML; MG/100ML
100 INJECTION, SOLUTION INTRAVENOUS CONTINUOUS
Status: DISCONTINUED | OUTPATIENT
Start: 2020-07-15 | End: 2020-07-16 | Stop reason: HOSPADM

## 2020-07-15 RX ORDER — LANOLIN ALCOHOL/MO/W.PET/CERES
1000 CREAM (GRAM) TOPICAL DAILY
Status: DISCONTINUED | OUTPATIENT
Start: 2020-07-16 | End: 2020-07-16 | Stop reason: HOSPADM

## 2020-07-15 RX ORDER — TRANEXAMIC ACID 10 MG/ML
1 INJECTION, SOLUTION INTRAVENOUS
Status: DISPENSED | OUTPATIENT
Start: 2020-07-15 | End: 2020-07-15

## 2020-07-15 RX ORDER — PROPOFOL 10 MG/ML
INJECTION, EMULSION INTRAVENOUS AS NEEDED
Status: DISCONTINUED | OUTPATIENT
Start: 2020-07-15 | End: 2020-07-15 | Stop reason: HOSPADM

## 2020-07-15 RX ORDER — HYDROMORPHONE HYDROCHLORIDE 1 MG/ML
1 INJECTION, SOLUTION INTRAMUSCULAR; INTRAVENOUS; SUBCUTANEOUS
Status: DISCONTINUED | OUTPATIENT
Start: 2020-07-15 | End: 2020-07-16 | Stop reason: HOSPADM

## 2020-07-15 RX ORDER — ROPIVACAINE HYDROCHLORIDE 5 MG/ML
INJECTION, SOLUTION EPIDURAL; INFILTRATION; PERINEURAL
Status: COMPLETED | OUTPATIENT
Start: 2020-07-15 | End: 2020-07-15

## 2020-07-15 RX ORDER — PROPOFOL 10 MG/ML
INJECTION, EMULSION INTRAVENOUS
Status: DISCONTINUED | OUTPATIENT
Start: 2020-07-15 | End: 2020-07-15 | Stop reason: HOSPADM

## 2020-07-15 RX ORDER — LANOLIN ALCOHOL/MO/W.PET/CERES
1 CREAM (GRAM) TOPICAL
Status: DISCONTINUED | OUTPATIENT
Start: 2020-07-16 | End: 2020-07-16 | Stop reason: HOSPADM

## 2020-07-15 RX ORDER — SODIUM CHLORIDE 0.9 % (FLUSH) 0.9 %
5-40 SYRINGE (ML) INJECTION AS NEEDED
Status: DISCONTINUED | OUTPATIENT
Start: 2020-07-15 | End: 2020-07-16 | Stop reason: HOSPADM

## 2020-07-15 RX ORDER — ONDANSETRON 2 MG/ML
4 INJECTION INTRAMUSCULAR; INTRAVENOUS
Status: DISCONTINUED | OUTPATIENT
Start: 2020-07-15 | End: 2020-07-16 | Stop reason: HOSPADM

## 2020-07-15 RX ORDER — AMOXICILLIN 250 MG
1 CAPSULE ORAL 2 TIMES DAILY
Status: DISCONTINUED | OUTPATIENT
Start: 2020-07-15 | End: 2020-07-16 | Stop reason: HOSPADM

## 2020-07-15 RX ADMIN — ROPIVACAINE HYDROCHLORIDE 30 ML: 5 INJECTION, SOLUTION EPIDURAL; INFILTRATION; PERINEURAL at 09:33

## 2020-07-15 RX ADMIN — CEFAZOLIN SODIUM 2 G: 2 SOLUTION INTRAVENOUS at 10:04

## 2020-07-15 RX ADMIN — PROPOFOL 30 MG: 10 INJECTION, EMULSION INTRAVENOUS at 10:04

## 2020-07-15 RX ADMIN — ACETAMINOPHEN 1000 MG: 500 TABLET ORAL at 13:27

## 2020-07-15 RX ADMIN — ACETAMINOPHEN 1000 MG: 500 TABLET ORAL at 22:07

## 2020-07-15 RX ADMIN — ASPIRIN 81 MG: 81 TABLET, COATED ORAL at 20:54

## 2020-07-15 RX ADMIN — PROPOFOL 20 MG: 10 INJECTION, EMULSION INTRAVENOUS at 10:10

## 2020-07-15 RX ADMIN — LIDOCAINE HYDROCHLORIDE 5 MG: 10 INJECTION, SOLUTION EPIDURAL; INFILTRATION; INTRACAUDAL; PERINEURAL at 09:59

## 2020-07-15 RX ADMIN — ONDANSETRON 4 MG: 2 INJECTION INTRAMUSCULAR; INTRAVENOUS at 19:00

## 2020-07-15 RX ADMIN — PROPOFOL 60 MCG/KG/MIN: 10 INJECTION, EMULSION INTRAVENOUS at 10:00

## 2020-07-15 RX ADMIN — SODIUM CHLORIDE, SODIUM LACTATE, POTASSIUM CHLORIDE, AND CALCIUM CHLORIDE: 600; 310; 30; 20 INJECTION, SOLUTION INTRAVENOUS at 11:24

## 2020-07-15 RX ADMIN — SODIUM CHLORIDE, SODIUM LACTATE, POTASSIUM CHLORIDE, AND CALCIUM CHLORIDE 125 ML/HR: 600; 310; 30; 20 INJECTION, SOLUTION INTRAVENOUS at 09:00

## 2020-07-15 RX ADMIN — KETOROLAC TROMETHAMINE 15 MG: 30 INJECTION, SOLUTION INTRAMUSCULAR at 20:55

## 2020-07-15 RX ADMIN — FENTANYL CITRATE 100 MCG: 50 INJECTION, SOLUTION INTRAMUSCULAR; INTRAVENOUS at 09:33

## 2020-07-15 RX ADMIN — BUPIVACAINE HYDROCHLORIDE IN DEXTROSE 12 MG: 7.5 INJECTION, SOLUTION SUBARACHNOID at 09:59

## 2020-07-15 RX ADMIN — CEFAZOLIN SODIUM 2 G: 2 SOLUTION INTRAVENOUS at 17:22

## 2020-07-15 RX ADMIN — TRANEXAMIC ACID 1 G: 10 INJECTION, SOLUTION INTRAVENOUS at 10:22

## 2020-07-15 RX ADMIN — KETOROLAC TROMETHAMINE 15 MG: 30 INJECTION, SOLUTION INTRAMUSCULAR at 13:27

## 2020-07-15 RX ADMIN — SENNOSIDES AND DOCUSATE SODIUM 1 TABLET: 8.6; 5 TABLET ORAL at 20:54

## 2020-07-15 RX ADMIN — SODIUM CHLORIDE, SODIUM LACTATE, POTASSIUM CHLORIDE, AND CALCIUM CHLORIDE 100 ML/HR: 600; 310; 30; 20 INJECTION, SOLUTION INTRAVENOUS at 13:28

## 2020-07-15 NOTE — ROUTINE PROCESS
1235  TRANSFER - IN REPORT:    Verbal report received from [facility]) on Marge Anguiano being received from [facility]) for routine post - op      Report consisted of patients Situation, Background, Assessment and   Recommendations(SBAR). Information from the following report(s) SBAR, Kardex, Intake/Output, MAR and Recent Results was reviewed with the receiving nurse. Opportunity for questions and clarification was provided. Assessment completed upon patients arrival to unit and care assumed.

## 2020-07-15 NOTE — BRIEF OP NOTE
Brief Postoperative Note    Patient: Juanita Hunt  YOB: 1945  MRN: [id number]    Date of Procedure: 7/15/2020     Pre-Op Diagnosis: RHEUMATOID ARTHRITIS RIGHT KNEE    Post-Op Diagnosis: Same as preoperative diagnosis. Procedure(s):  RIGHT TOTAL KNEE ARTHROPLASTY AND ALL INDICATED PROCEDURES    Surgeon(s):  Kai Cortez MD    Surgical Assistant: None    Anesthesia: Regional     Estimated Blood Loss (mL): less than 50     Complications: None    Specimens: * No specimens in log *     Implants:   Implant Name Type Inv.  Item Serial No.  Lot No. LRB No. Used Action   CEMENT HI VISCOSITY SMARTSET 40GR - [id number]  CEMENT HI VISCOSITY SMARTSET 40GR  [facility] [employer] [id number] Right 1 Implanted   CEMENT BNE GENTAMC GHV 40GM -- SMARTSET - [id number]  CEMENT BNE GENTAMC GHV 40GM -- SMARTSET  [facility] [employer] [id number] Right 1 Implanted   TIBIAL BASE   N/A [employer] [id number] Right 1 Implanted   IMPL KNEE FEMORAL APARNA SZ4 - SN/A  IMPL KNEE FEMORAL APARNA SZ4 N/A [facility] [employer] [id number] Right 1 Implanted   IMPL KNEE PATELLA CEMNTED [id number] - SN/A  IMPL KNEE PATELLA CEMNTED [id number] N/A [facility] [employer] [id number] Right 1 Implanted   IMPL KNEE TIB INSRT ATTUNE SZ4 5MM - SN/A  IMPL KNEE TIB INSRT ATTUNE SZ4 5MM N/A [facility] [employer] [id number] Right 1 Implanted       Drains: * No LDAs found *    Findings: DJD    Electronically Signed by Cailin Isidro MD on 7/15/2020 at 11:37 AM

## 2020-07-15 NOTE — PROGRESS NOTES
Admission Medication Reconciliation has been performed on this patient POD 0 consisting of review of admission orders and PTA Home Medication List in relation to patient's Allergies and PMH as well as formulary availability and to ensure home medications have been continued/held appropriately during patient's hospital stay and timed appropriately based on last dose taken. Pt here for KNEE ARTHROPLASTY TOTAL with Lily Santizo MD    Pt allergies:Ciprofloxacin and Zyrtec [cetirizine]      Is pt considered \"special population\" ?:  no    Added/updated outpatient pharmacy to include: [facility]    Patient appears to have been  compliant with instructions received from PAT RN regarding the taking/holding of home medications prior to arriving for surgery. Prior to Admission Medications   Prescriptions Last Dose Informant Patient Reported? Taking?   acetaminophen (TYLENOL) 500 mg tablet 7/8/2020 at Unknown time  Yes Yes   Sig: Take 1,000 mg by mouth every six (6) hours as needed for Pain. aspirin delayed-release 81 mg tablet 7/8/2020  Yes Yes   Sig: Take 81 mg by mouth daily. calcium-cholecalciferol, D3, (CALTRATE 600+D) tablet 7/8/2020 at Unknown time  Yes Yes   Sig: Take 1 tablet by mouth daily. cyanocobalamin 1,000 mcg tablet 7/8/2020 at Unknown time  Yes Yes   Sig: Take 1,000 mcg by mouth daily. magnesium oxide 500 mg tab 7/8/2020 at Unknown time  Yes Yes   Sig: Take  by mouth.   montelukast (SINGULAIR) 10 mg tablet 5/15/2020  Yes No   Sig: Take 10 mg by mouth as needed for Other (allergy symptoms). omega-3 fatty acids-vitamin e (FISH OIL) 1,000 mg cap 7/8/2020 at Unknown time  Yes Yes   Sig: Take 1 Cap by mouth.       Facility-Administered Medications: None           Lora Walton [id number] Pharmacist  [phone]

## 2020-07-15 NOTE — ANESTHESIA POSTPROCEDURE EVALUATION
Post-Anesthesia Evaluation and Assessment    Cardiovascular Function/Vital Signs  Visit Vitals  /81   Pulse 66   Temp 36.3 °C (97.4 °F)   Resp 10   Ht 5' 4\" (1.626 m)   Wt 64.5 kg (142 lb 3 oz)   SpO2 100%   BMI 24.41 kg/m²       Patient is status post Procedure(s):  RIGHT TOTAL KNEE ARTHROPLASTY AND ALL INDICATED PROCEDURES. Nausea/Vomiting: Controlled. Postoperative hydration reviewed and adequate. Pain:  Pain Scale 1: Numeric (0 - 10) (07/15/20 1219)  Pain Intensity 1: 0 (07/15/20 1219)   Managed. Neurological Status:   Neuro (WDL): Within Defined Limits (07/15/20 0912)   At baseline. Mental Status and Level of Consciousness: Baseline and appropriate for discharge. Pulmonary Status:   O2 Device: Nasal cannula (07/15/20 1210)   Adequate oxygenation and airway patent. Complications related to anesthesia: None    Post-anesthesia assessment completed. No concerns. Patient has met all discharge requirements.     Signed By: Adiel Alvarez MD    July 15, 2020

## 2020-07-15 NOTE — PROGRESS NOTES
Transferred Pt to Nathan Arceo RN at bedside. Dressing CDI. Left pt stable. DUal skin assessment compltete     07/15/20 1253   Modified Юлия Score   Activity 2   Respiration 2   Circulation 2   Consciousness 2   O2 Saturation 1   Юлия Score 9   Vital Signs   Temp 97.3 °F (36.3 °C)   Temp Source Oral   Pulse (Heart Rate) 72   Resp Rate 15   /83   Oxygen Therapy   O2 Sat (%) 100 %

## 2020-07-15 NOTE — PROGRESS NOTES
Problem: Falls - Risk of  Goal: *Absence of Falls  Description: Document Lc Shetty Fall Risk and appropriate interventions in the flowsheet.   7/15/2020 1315 by Michael Tineo RN  Outcome: Progressing Towards Goal  Note: Fall Risk Interventions:  Mobility Interventions: Patient to call before getting OOB, PT Consult for mobility concerns, Utilize walker, cane, or other assistive device         Medication Interventions: Teach patient to arise slowly    Elimination Interventions: Call light in reach           7/15/2020 1313 by Michael Tineo RN  Outcome: Progressing Towards Goal  Note: Fall Risk Interventions:  Mobility Interventions: Patient to call before getting OOB, PT Consult for mobility concerns, Utilize walker, cane, or other assistive device         Medication Interventions: Teach patient to arise slowly    Elimination Interventions: Call light in reach              Problem: Patient Education: Go to Patient Education Activity  Goal: Patient/Family Education  7/15/2020 1315 by Michael Tineo RN  Outcome: Progressing Towards Goal  7/15/2020 1313 by Michael Tineo RN  Outcome: Progressing Towards Goal     Problem: Pain  Goal: *Control of Pain  Outcome: Progressing Towards Goal  Goal: *PALLIATIVE CARE:  Alleviation of Pain  Outcome: Progressing Towards Goal     Problem: Patient Education: Go to Patient Education Activity  Goal: Patient/Family Education  Outcome: Progressing Towards Goal     Problem: Knee Replacement: Day of Surgery/Unit  Goal: Off Pathway (Use only if patient is Off Pathway)  Outcome: Progressing Towards Goal  Goal: Activity/Safety  Outcome: Progressing Towards Goal  Goal: Consults, if ordered  Outcome: Progressing Towards Goal  Goal: Diagnostic Test/Procedures  Outcome: Progressing Towards Goal  Goal: Nutrition/Diet  Outcome: Progressing Towards Goal  Goal: Medications  Outcome: Progressing Towards Goal  Goal: Respiratory  Outcome: Progressing Towards Goal  Goal: Treatments/Interventions/Procedures  Outcome: Progressing Towards Goal  Goal: Psychosocial  Outcome: Progressing Towards Goal  Goal: *Initiate mobility  Outcome: Progressing Towards Goal  Goal: *Optimal pain control at patient's stated goal  Outcome: Progressing Towards Goal  Goal: *Hemodynamically stable  Outcome: Progressing Towards Goal

## 2020-07-15 NOTE — PERIOP NOTES
Reviewed PTA medication list with patient/caregiver and patient/caregiver denies any additional medications. Patient admits to having a responsible adult care for them for at least 24 hours after surgery.     Dual skin assessment completed by Roland Nieto RN and Tien Miranda RN.

## 2020-07-15 NOTE — PERIOP NOTES
TRANSFER - OUT REPORT:    Verbal report given to Marge Trejo (name) on Minor Garcia  being transferred to [facility](unit) for routine post - op       Report consisted of patients Situation, Background, Assessment and   Recommendations(SBAR). Information from the following report(s) SBAR, OR Summary, Intake/Output, MAR, Recent Results and Cardiac Rhythm NSR was reviewed with the receiving nurse. Lines:   Peripheral IV 07/15/20 Posterior;Right Hand (Active)   Site Assessment Clean, dry, & intact 07/15/20 1222   Phlebitis Assessment 0 07/15/20 1222   Infiltration Assessment 0 07/15/20 1222   Dressing Status Clean, dry, & intact 07/15/20 1222   Dressing Type Transparent 07/15/20 1222   Hub Color/Line Status Infusing 07/15/20 1222   Alcohol Cap Used No 07/15/20 4566        Opportunity for questions and clarification was provided.       Patient transported with:   O2 @ 2 liters       Intake/Output Summary (Last 24 hours) at 7/15/2020 1238  Last data filed at 7/15/2020 1223  Gross per 24 hour   Intake 1200 ml   Output --   Net 1200 ml

## 2020-07-15 NOTE — PROGRESS NOTES
Problem: Mobility Impaired (Adult and Pediatric)  Goal: *Acute Goals and Plan of Care (Insert Text)  Description: Physical Therapy Goals  Initiated 7/15/2020  to be met within 1-2 days  STG's:  1. Bed mobility:  Supine to/from sit with S in prep for ADL activity. 2. Transfers:  Sit to/from stand with S/RW in prep for gait. LTG's  1. Ambulation:  Ambulate 150 ft.with S/RW for home mobility at discharge. 2. Step Negotiation: Ascend/Descend 3-5 steps with CGA with HR for home navigation as indicated. 3. Patient Education:  S/Independent with HEP for home performance accurately at discharge. Outcome: Progressing Towards Goal  PHYSICAL THERAPY EVALUATION    Patient: Jake Bishop (75 y.o. female)  Date: 7/15/2020  Primary Diagnosis: History of total knee arthroplasty, right [Z96.651]  Procedure(s) (LRB):  RIGHT TOTAL KNEE ARTHROPLASTY AND ALL INDICATED PROCEDURES (Right) Day of Surgery   Precautions:Fall, WBAT    ASSESSMENT :  Based on the objective data described below, the patient presents with decreased ROM/motor performance R knee, decreased independence in functional mobility with regard to bed mobility, transfers, and gait following L TKR. Reports no pian during session. Pt supine >sit CGA, and transfers sit <>stand with vc for safety/technique. Pt assisted to bathroom and required vc/CGA for safety with toilet transfers/hygiene care. Able to participate in GT/RW/CGA  140 ft.  Gait slow, step to pattern. Verbal cues for safety/sequencing. Educated in stair nego and performed same with min A using HR's as noted below. Pt returned to room and left back in bed with SCD's/ice in place, all needs in reach and nurse Yoon Dior notified. Will continue for instruction in HEP and progression for safety during transfers/gt level surface and stairs. Recommend HHPT for follow up physical therapy upon discharge to reach maximal level of independence/safety with functional mobility.      Pt Education: Role of physical therapy in acute care setting, fall prevention and safety/technique during functional mobility tasks      Patient will benefit from skilled intervention to address the above impairments. Patients rehabilitation potential is considered to be Good  Factors which may influence rehabilitation potential include:   [x]         None noted  []         Mental ability/status  []         Medical condition  []         Home/family situation and support systems  []         Safety awareness  []         Pain tolerance/management  []         Other:      PLAN :  Recommendations and Planned Interventions:  [x]           Bed Mobility Training             []    Neuromuscular Re-Education  [x]           Transfer Training                   []    Orthotic/Prosthetic Training  [x]           Gait Training                          []    Modalities  [x]           Therapeutic Exercises          []    Edema Management/Control  [x]           Therapeutic Activities            [x]    Patient and Family Training/Education  []           Other (comment):    Frequency/Duration: Patient will be followed by physical therapy 2 times per day to address goals. Discharge Recommendations: Home Health  Further Equipment Recommendations for Discharge: N/A     SUBJECTIVE:   Patient stated I feel pretty good.     OBJECTIVE DATA SUMMARY:     Past Medical History:   Diagnosis Date    Adverse effect of anesthesia 2008    couldnt smell for 1 year after bladder surgery patient states    Arthritis     Autoimmune disease ([facility] [address]     RA    Ill-defined condition     seasonal allergies    Menopause     Palpitations 06/06/2013    History of    Rheumatoid arthritis(714.0)      Past Surgical History:   Procedure Laterality Date    HX APPENDECTOMY      HX BLADDER SUSPENSION      2008.     HX KNEE ARTHROSCOPY Left 2005    HX KNEE REPLACEMENT Left     HX ORTHOPAEDIC Left     arm surgery     Barriers to Learning/Limitations: None  Compensate with: N/A  Prior Level of Function/Home Situation: amb without AD PTA  Home Situation  Home Environment: Private residence  # Steps to Enter: 4  Rails to Enter: Yes  Hand Rails : Bilateral(far apart)  One/Two Story Residence: Two story  # of Interior Steps: 16  Interior Rails: Both(R side top to bottom)  Living Alone: No  Support Systems: Spouse/Significant Other/Partner  Patient Expects to be Discharged to[de-identified] Private residence  Current DME Used/Available at Home: [employer], straight, Walker, rolling  Critical Behavior:  Neurologic State: Alert; Appropriate for age  Orientation Level: Oriented X4  Cognition: Follows commands(dereased safety awareness)  Safety/Judgement: Awareness of environment; Fall prevention  Psychosocial  Patient Behaviors: Calm; Cooperative  Purposeful Interaction: Yes  Pt Identified Daily Priority: Clinical issues (comment)  Caritas Process: Nurture loving kindness  Caring Interventions: Reassure  Reassure: Therapeutic listening  Skin Condition/Temp: Dry;Warm  Skin Integrity: Incision (comment)(post op drsg c/d/i)  Skin Integumentary  Skin Color: Appropriate for ethnicity  Skin Condition/Temp: Dry;Warm  Skin Integrity: Incision (comment)(post op drsg c/d/i)  Turgor: Non-tenting  Strength:    Strength: Generally decreased, functional  Tone & Sensation:   Sensation: Intact  Range Of Motion:  AROM: Generally decreased, functional  Functional Mobility:  Bed Mobility:  Supine to Sit: Contact guard assistance  Sit to Supine: Contact guard assistance  Scooting: Contact guard assistance  Transfers:  Sit to Stand: Contact guard assistance(vc for safety)  Stand to Sit: Contact guard assistance(vc for safety/technique)  Balance:   Sitting: Intact  Standing: Intact; With support  Ambulation/Gait Training:  Distance (ft): 140 Feet (ft)  Assistive Device: Gait belt;Walker, rolling  Ambulation - Level of Assistance: Contact guard assistance  Gait Description (WDL): Exceptions to WDL  Gait Abnormalities: Decreased step clearance; Step to gait  Right Side Weight Bearing: As tolerated  Speed/Sofya: Pace decreased (<100 feet/min)  Step Length: Right shortened;Left shortened  Swing Pattern: Right asymmetrical;Left asymmetrical  Interventions: Safety awareness training;Verbal cues; Visual/Demos  Stairs:  Number of Stairs Trained: 6  Stairs - Level of Assistance: Minimum assistance(vc)  Rail Use: Both(and R ascending/L descending)  Pain:  Pain Scale 1: Numeric (0 - 10)  Pain Intensity 1: 0  Pain Location 1: Knee  Pain Orientation 1: Right  Pain Description 1: Dull  Pain Intervention(s) 1: Declines  Activity Tolerance:   Good   Please refer to the flowsheet for vital signs taken during this treatment. After treatment:   []         Patient left in no apparent distress sitting up in chair  [x]         Patient left in no apparent distress in bed  [x]         Call bell left within reach  [x]         Nursing notified  []         Caregiver present  []         Bed alarm activated    COMMUNICATION/EDUCATION:   [x]         Fall prevention education was provided and the patient/caregiver indicated understanding. [x]         Patient/family have participated as able in goal setting and plan of care. [x]         Patient/family agree to work toward stated goals and plan of care. []         Patient understands intent and goals of therapy, but is neutral about his/her participation. []         Patient is unable to participate in goal setting and plan of care.     Eval Complexity: History: HIGH Complexity :3+ comorbidities / personal factors will impact the outcome/ POC Exam:LOW Complexity : 1-2 Standardized tests and measures addressing body structure, function, activity limitation and / or participation in recreation  Presentation: LOW Complexity : Stable, uncomplicated  Clinical Decision Making:Low Complexity    Overall Complexity:LOW     Thank you for this referral.  Jacobo Almeida, PT   Time Calculation: 34 mins

## 2020-07-15 NOTE — ANESTHESIA PROCEDURE NOTES
Peripheral Block    Start time: 7/15/2020 9:29 AM  End time: 7/15/2020 9:33 AM  Performed by: Sarah Nava MD  Authorized by: Sarah Nava MD       Pre-procedure: Indications: at surgeon's request and post-op pain management    Preanesthetic Checklist: patient identified, risks and benefits discussed, site marked, timeout performed, anesthesia consent given and patient being monitored    Timeout Time: 09:29          Block Type:   Block Type:   Adductor canal  Laterality:  Right  Monitoring:  Standard ASA monitoring, continuous pulse ox, frequent vital sign checks, heart rate, responsive to questions and oxygen  Injection Technique:  Single shot  Procedures: ultrasound guided    Patient Position: supine  Prep: chlorhexidine    Location:  Mid thigh  Needle Type:  SonoPlex  Needle Gauge:  21 G  Needle Localization:  Ultrasound guidance    Assessment:  Number of attempts:  1  Injection Assessment:  Incremental injection every 5 mL, no paresthesia, ultrasound image on chart, local visualized surrounding nerve on ultrasound, negative aspiration for blood and no intravascular symptoms  Patient tolerance:  Patient tolerated the procedure well with no immediate complications

## 2020-07-15 NOTE — ANESTHESIA PROCEDURE NOTES
Spinal Block    Start time: 7/15/2020 9:56 AM  End time: 7/15/2020 9:59 AM  Performed by: Carito Martins MD  Authorized by: Carito Martins MD     Pre-procedure: Indications: primary anesthetic  Preanesthetic Checklist: patient identified, risks and benefits discussed, anesthesia consent, site marked, patient being monitored and timeout performed    Timeout Time: 09:56          Spinal Block:   Patient Position:  Seated  Prep Region:  Lumbar  Prep: Betadine      Location:  L3-4  Technique:  Single shot        Needle:   Needle Type:   Sridevi  Needle Gauge:  25 G  Attempts:  1      Events: CSF confirmed, no blood with aspiration and no paresthesia        Assessment:  Insertion:  Uncomplicated  Patient tolerance:  Patient tolerated the procedure well with no immediate complications

## 2020-07-15 NOTE — PROGRESS NOTES
Progress Note        Patient: Enma Maldonado MRN: [id number]  SSN: [id number]    YOB: 1945  Age: 76 y.o. Sex: female      Day of Surgery status post Procedure(s) (LRB):  RIGHT TOTAL KNEE ARTHROPLASTY AND ALL INDICATED PROCEDURES (Right)    Admit Date: 7/15/2020  Admit Diagnosis: History of total knee arthroplasty, right [Z96.651]    Subjective:      Doing well. No complaints. No SOB. No Chest Pain. No Nausea or Vomiting. No problems eating or voiding. Objective:        Temp (24hrs), Av.5 °F (36.4 °C), Min:97.3 °F (36.3 °C), Max:97.9 °F (36.6 °C)    Body mass index is 24.41 kg/m².   Patient Vitals for the past 12 hrs:   BP Temp Pulse Resp SpO2 Height Weight   07/15/20 1558 112/52 97.9 °F (36.6 °C) 70 16 100 % -- --   07/15/20 1440 126/60 97.5 °F (36.4 °C) 76 16 100 % -- --   07/15/20 1409 136/63 97.6 °F (36.4 °C) 83 16 99 % -- --   07/15/20 1253 145/83 97.3 °F (36.3 °C) 72 15 100 % -- --   07/15/20 1230 110/81 -- 66 10 100 % -- --   07/15/20 1222 -- -- 64 10 100 % -- --   07/15/20 1221 -- -- 64 10 100 % -- --   07/15/20 1220 126/62 -- -- -- 100 % -- --   07/15/20 1215 120/64 -- 62 10 100 % -- --   07/15/20 1210 123/61 -- 65 12 100 % -- --   07/15/20 1205 124/61 -- 66 10 100 % -- --   07/15/20 1201 -- -- 68 10 100 % -- --   07/15/20 1200 123/59 -- 68 9 100 % -- --   07/15/20 1159 -- -- 67 8 100 % -- --   07/15/20 1158 -- -- 82 22 100 % -- --   07/15/20 1156 113/64 -- -- -- -- -- --   07/15/20 0944 137/72 -- 74 8 100 % -- --   07/15/20 0942 130/68 -- 63 10 100 % -- --   07/15/20 0939 144/82 -- 63 9 100 % -- --   07/15/20 0936 150/76 -- 63 10 100 % -- --   07/15/20 0933 149/75 -- 67 12 100 % -- --   07/15/20 0930 153/83 -- 76 14 -- -- --   07/15/20 0927 162/77 -- 76 16 100 % -- --   07/15/20 0925 (!) 150/91 -- 79 18 99 % -- --   07/15/20 0922 -- -- 69 12 98 % -- --   07/15/20 0813 134/57 -- -- -- -- -- --   07/15/20 0810 121/63 97.4 °F (36.3 °C) 69 16 98 % 5' 4\" (1.626 m) 64.5 kg (142 lb 3 oz)     No results for input(s): HGB, HCT, INR, NA, K, CL, CO2, BUN, CREA, GLU, HGBEXT, HCTEXT, INREXT in the last 72 hours. Physical Exam:  Vital Signs are Stable. No Acute Distress. Alert and Oriented. Negative Homans sign. Toes AROM Full. Neurovascular exam is normal.    Dressing is Clean, Dry, and Intact. Assessment/Plan:     1. Patient doing well. 2. Out of BED / PT / WBAT  3. DVT ppx: Mobilization, Ecotrin, ARMIN hose, and mechanical compression  4.  D/c planning    Continue PT/OT  Continue ROM    Discharge Plan: Home with [facility]    Signed By: Amie Arora MD     July 15, 2020

## 2020-07-15 NOTE — ANESTHESIA PREPROCEDURE EVALUATION
Relevant Problems   No relevant active problems       Anesthetic History   No history of anesthetic complications            Review of Systems / Medical History  Patient summary reviewed, nursing notes reviewed and pertinent labs reviewed    Pulmonary  Within defined limits                 Neuro/Psych   Within defined limits           Cardiovascular  Within defined limits                Exercise tolerance: >4 METS     GI/Hepatic/Renal  Within defined limits              Endo/Other        Arthritis     Other Findings              Physical Exam    Airway  Mallampati: II  TM Distance: 4 - 6 cm  Neck ROM: normal range of motion   Mouth opening: Normal     Cardiovascular  Regular rate and rhythm,  S1 and S2 normal,  no murmur, click, rub, or gallop  Rhythm: regular  Rate: normal         Dental    Dentition: Poor dentition     Pulmonary  Breath sounds clear to auscultation               Abdominal  GI exam deferred       Other Findings            Anesthetic Plan    ASA: 2  Anesthesia type: spinal      Post-op pain plan if not by surgeon: peripheral nerve block single    Induction: Intravenous  Anesthetic plan and risks discussed with: Patient

## 2020-07-15 NOTE — INTERVAL H&P NOTE
Update History & Physical    The Patient's History and Physical was reviewed with the patient and I examined the patient. There was no change. The surgical site was confirmed by the patient and me. Plan:  The risk, benefits, expected outcome, and alternative to the recommended procedure have been discussed with the patient. Patient understands and wants to proceed with the procedure.     Electronically signed by Robyn Vang MD on 7/15/2020 at 8:26 AM

## 2020-07-15 NOTE — PROGRESS NOTES
[address] care of patient at this time, assessment complete. Patient alert and oriented x4. Denies SOB and chest pain. Patient lungs clear bilaterally. Cap refilled  less than 3 seconds. Patient notes numbness BLE. Stated pain 0/10. Patient has 18G IV to right hand. Dressing to RLE, ACE wrap C/D/I . TEDs applied to LLE, Plexi applied to BLE. Patient encouraged to use IS. Patient verbalized understanding. Ice pack in place, call light and personal items in reach, bed in low position and locked, will continue to monitor patient. Fall risk arm band in place. [phone]  PT working with patient in room at this time. [phone]  Patient had uneventful shift, pain controlled by PRN pain medication. No signs or symptoms of distress. Patient ambulating and voiding sufficient amounts. Plan for patient to d/c home tomorrow. 1905  Bedside in verbal shift change report given to [address] [place] [address]  (oncoming nurse) by Álvaro Rodriguez RN (off going nurse). Report included the following information: SBAR, KARDEX, MAR and recent results.

## 2020-07-16 ENCOUNTER — HOME HEALTH ADMISSION (OUTPATIENT)
Dept: HOME HEALTH SERVICES | Facility: HOME HEALTH | Age: 75
End: 2020-07-16
Payer: MEDICARE

## 2020-07-16 VITALS
HEIGHT: 64 IN | WEIGHT: 142.19 LBS | SYSTOLIC BLOOD PRESSURE: 117 MMHG | OXYGEN SATURATION: 98 % | RESPIRATION RATE: 18 BRPM | TEMPERATURE: 98.7 F | BODY MASS INDEX: 24.28 KG/M2 | HEART RATE: 70 BPM | DIASTOLIC BLOOD PRESSURE: 46 MMHG

## 2020-07-16 PROCEDURE — 74011250637 HC RX REV CODE- 250/637: Performed by: PHYSICIAN ASSISTANT

## 2020-07-16 PROCEDURE — 65390000012 HC CONDITION CODE 44 OBSERVATION

## 2020-07-16 PROCEDURE — 74011250637 HC RX REV CODE- 250/637: Performed by: ORTHOPAEDIC SURGERY

## 2020-07-16 PROCEDURE — 97166 OT EVAL MOD COMPLEX 45 MIN: CPT

## 2020-07-16 PROCEDURE — 99218 HC RM OBSERVATION: CPT

## 2020-07-16 PROCEDURE — 97535 SELF CARE MNGMENT TRAINING: CPT

## 2020-07-16 PROCEDURE — 74011250636 HC RX REV CODE- 250/636: Performed by: ORTHOPAEDIC SURGERY

## 2020-07-16 PROCEDURE — 97116 GAIT TRAINING THERAPY: CPT

## 2020-07-16 RX ORDER — PROMETHAZINE HYDROCHLORIDE 25 MG/1
25 TABLET ORAL
Status: DISCONTINUED | OUTPATIENT
Start: 2020-07-16 | End: 2020-07-16 | Stop reason: HOSPADM

## 2020-07-16 RX ORDER — OXYCODONE AND ACETAMINOPHEN 5; 325 MG/1; MG/1
1-2 TABLET ORAL
Qty: 56 TAB | Refills: 0 | Status: SHIPPED | OUTPATIENT
Start: 2020-07-16 | End: 2020-07-23

## 2020-07-16 RX ORDER — ASPIRIN 81 MG/1
81 TABLET ORAL 2 TIMES DAILY
Qty: 84 TAB | Refills: 0 | Status: SHIPPED | OUTPATIENT
Start: 2020-07-16

## 2020-07-16 RX ADMIN — ACETAMINOPHEN 1000 MG: 500 TABLET ORAL at 06:32

## 2020-07-16 RX ADMIN — Medication 400 MG: at 08:39

## 2020-07-16 RX ADMIN — ONDANSETRON 4 MG: 2 INJECTION INTRAMUSCULAR; INTRAVENOUS at 06:47

## 2020-07-16 RX ADMIN — CYANOCOBALAMIN TAB 1000 MCG 1000 MCG: 1000 TAB at 08:38

## 2020-07-16 RX ADMIN — KETOROLAC TROMETHAMINE 15 MG: 30 INJECTION, SOLUTION INTRAMUSCULAR at 08:33

## 2020-07-16 RX ADMIN — SODIUM CHLORIDE, SODIUM LACTATE, POTASSIUM CHLORIDE, AND CALCIUM CHLORIDE 100 ML/HR: 600; 310; 30; 20 INJECTION, SOLUTION INTRAVENOUS at 08:44

## 2020-07-16 RX ADMIN — OMEGA-3 FATTY ACIDS CAP 1000 MG 1 CAPSULE: 1000 CAP at 08:38

## 2020-07-16 RX ADMIN — SENNOSIDES AND DOCUSATE SODIUM 1 TABLET: 8.6; 5 TABLET ORAL at 08:38

## 2020-07-16 RX ADMIN — Medication 1 TABLET: at 08:37

## 2020-07-16 RX ADMIN — ASPIRIN 81 MG: 81 TABLET, COATED ORAL at 08:37

## 2020-07-16 RX ADMIN — FERROUS SULFATE TAB 325 MG (65 MG ELEMENTAL FE) 325 MG: 325 (65 FE) TAB at 08:37

## 2020-07-16 RX ADMIN — PROMETHAZINE HYDROCHLORIDE 25 MG: 25 TABLET ORAL at 10:51

## 2020-07-16 RX ADMIN — CEFAZOLIN SODIUM 2 G: 2 SOLUTION INTRAVENOUS at 01:43

## 2020-07-16 RX ADMIN — Medication 10 ML: at 08:36

## 2020-07-16 NOTE — PROGRESS NOTES
Discharge instructions reviewed with the patient. Patient verbalized understanding and verified by teach back. All questions answered. IV discontinued, no redness, swelling or pain noted. Patient awaiting  for transportation home, with an ETA of 5min. Patient discharged off the unit via wheelchair.  Patient armband removed and shredded

## 2020-07-16 NOTE — PROGRESS NOTES
Head to toe assessment completed at this time. Pt denies chest pain or SOB. Pt denies any numbness or tingling to extremities. Fall risk band in place. Pt educated on side effects of medication and the use of the incentive spirometer. Pt encouraged to manage pain and call for assistance. Pt left in bed with call light within reach, bed in low position and wheels locked. Will continue to monitor. Pt requested to get back in bed    Shift Summ- Pt left in the room no signs of distress. Pt educated on side effects of medication and use of incentive spirometer. Pt pain managed by PRN medication per MAR. All Pt's concerns addressed.

## 2020-07-16 NOTE — DISCHARGE INSTRUCTIONS
[facility]     Patient Discharge Instructions    Umang Avers / [id number] : 1945    Admitted 7/15/2020 Discharged: 2020     IF YOU HAVE ANY PROBLEMS ONCE YOU ARE AT HOME CALL THE FOLLOWING NUMBERS:   Main office number: [phone]    Your follow up appointment to see either Dr. Jake Kruse is scheduled in 1-2 weeks. If you are unsure of your appointment date call the office at [phone]. Take Home Medications     · Resume your home medictions as directed  · A prescription for pain medication has been given   · It is important that you take the medication exactly as they are prescribed. · Keep your medication in the bottles provided by the pharmacist and keep a list of the medication names, dosages, and times to be taken in your wallet. · Do not take other medications without consulting your doctor. · Note:  If you have already received and/or filled a prescription for one or more of the medications you've received a prescription for when leaving the hospital, you may disguard the duplicate prescription. What to do at [address] [place] [address] Ave your prehospital diet. If you have excessive nausea or vomitting call your doctor's office     Wear portable sequential compressive devices at least 18 hours per day for 2 weeks. They may be used beyond 2 weeks as needed to help control swelling. ARMIN hose should be worn during the day - may be removed for sleep. Should be worn on both legs for 2 weeks and then on the operative extremity for a total of 6 weeks. Begin In-Home Physical Therapy; 3 times a week to work on gait training, range of motion, strengthening, and weight bearing exercises as tolerable. Continue to use your walker or cane when walking. May progress from the walker to a cane to complete total bearing as tolerable. Keep incision DRY. You may need to sponge bathe to keep the incision dry.     When to Call    - Call if you have a temperature greater then 101  - Unable to keep food down  - Are unable to bear any wieght   - Need a pain medication refill     Information obtained by :  I understand that if any problems occur once I am at home I am to contact my physician. I understand and acknowledge receipt of the instructions indicated above. Physician's or R.N.'s Signature                                                                  Date/Time                                                                                                                                              Patient or Representative Signature                                                          Date/Time            DISCHARGE SUMMARY from Nurse    PATIENT INSTRUCTIONS:    After general anesthesia or intravenous sedation, for 24 hours or while taking prescription Narcotics:  · Limit your activities  · Do not drive and operate hazardous machinery  · Do not make important personal or business decisions  · Do  not drink alcoholic beverages  · If you have not urinated within 8 hours after discharge, please contact your surgeon on call. Report the following to your surgeon:  · Excessive pain, swelling, redness or odor of or around the surgical area  · Temperature over 100.5  · Nausea and vomiting lasting longer than 4 hours or if unable to take medications  · Any signs of decreased circulation or nerve impairment to extremity: change in color, persistent  numbness, tingling, coldness or increase pain  · Any questions    What to do at Home:  Recommended activity:    *  Please give a list of your current medications to your Primary Care Provider. *  Please update this list whenever your medications are discontinued, doses are      changed, or new medications (including over-the-counter products) are added.     *  Please carry medication information at all times in case of emergency situations. These are general instructions for a healthy lifestyle:    No smoking/ No tobacco products/ Avoid exposure to second hand smoke  Surgeon General's Warning:  Quitting smoking now greatly reduces serious risk to your health. Obesity, smoking, and sedentary lifestyle greatly increases your risk for illness    A healthy diet, regular physical exercise & weight monitoring are important for maintaining a healthy lifestyle    You may be retaining fluid if you have a history of heart failure or if you experience any of the following symptoms:  Weight gain of 3 pounds or more overnight or 5 pounds in a week, increased swelling in our hands or feet or shortness of breath while lying flat in bed. Please call your doctor as soon as you notice any of these symptoms; do not wait until your next office visit. The discharge information has been reviewed with the patient. The patient verbalized understanding. Discharge medications reviewed with the patient and appropriate educational materials and side effects teaching were provided.   ___________________________________________________________________________________________________________________________________

## 2020-07-16 NOTE — ROUTINE PROCESS
Bedside and Verbal shift change report given to [facility] B., RN (oncoming nurse) by Avani Moncada RN (offgoing nurse). Report included the following information SBAR, Kardex, OR Summary, Intake/Output, MAR and Recent Results.

## 2020-07-16 NOTE — PROGRESS NOTES
OCCUPATIONAL THERAPY EVALUATION/DISCHARGE    Patient: Richar Avalos (1 y.o. female)  Date: 7/16/2020  Primary Diagnosis: History of total knee arthroplasty, right [Z96.651]  Procedure(s) (LRB):  RIGHT TOTAL KNEE ARTHROPLASTY AND ALL INDICATED PROCEDURES (Right) 1 Day Post-Op   Precautions: R TKA  Fall, WBAT  PLOF: Patient was independent    ASSESSMENT AND RECOMMENDATIONS:  Based on the objective data described below, the patient presents with RLE decreased ROM and strength affecting LE ADLs. Patient able to utilize BLE  bending forward with BLE for sock donning. Encouraged pt to attempt LLE ankle-over-knee or side sitting for back protection, but pt prefers bending forward. Pt able to walk in room w/o assist and stand at sinks for simple grooming. Pt will require assist w/ compression hose which patient reports spouse can assist.       Education: Reviewed home safety, body mechanics, importance of moving every hour to prevent joint stiffness, role of ice for edema/pain control, Rolling Walker management/safety, and adaptive dressing techniques with patient verbalizing  understanding at this time     Skilled Occupational Therapy is not indicated at this time in this setting. Patient should continue to improve as pain and ROM/strength improves. Discharge Recommendations: Home Health  Further Equipment Recommendations for Discharge: N/A      SUBJECTIVE:   Patient stated I'm just nauseated. The doctor last time gave me a patch.     OBJECTIVE DATA SUMMARY:     Past Medical History:   Diagnosis Date    Adverse effect of anesthesia 2008    couldnt smell for 1 year after bladder surgery patient states    Arthritis     Autoimmune disease ([facility] [address]     RA    Ill-defined condition     seasonal allergies    Menopause     Palpitations 06/06/2013    History of    Rheumatoid arthritis(714.0)      Past Surgical History:   Procedure Laterality Date    HX APPENDECTOMY      HX BLADDER SUSPENSION      2008.     HX KNEE ARTHROSCOPY Left 2005    HX KNEE REPLACEMENT Left     HX ORTHOPAEDIC Left     arm surgery     Barriers to Learning/Limitations: yes;  language  Compensate with: visual, verbal, tactile, kinesthetic cues/model    Home Situation/Prior level of Function:   Home Situation  Home Environment: Private residence  # Steps to Enter: 4  Rails to Enter: Yes  Hand Rails : Bilateral(far apart)  One/Two Story Residence: Two story  # of Interior Steps: 16  Interior Rails: Both(R side top to bottom)  Living Alone: No  Support Systems: Spouse/Significant Other/Partner  Patient Expects to be Discharged to[de-identified] Private residence  Current DME Used/Available at Home: [address] [place], straight, Walker, rolling  [x]  Right hand dominant   []  Left hand dominant    Cognitive/Behavioral Status:  Neurologic State: Alert; Appropriate for age  Orientation Level: Oriented X4  Cognition: Appropriate for age attention/concentration  Safety/Judgement: Awareness of environment; Fall prevention; Insight into deficits    Skin: R knee incision w/ Mepilex/Orthofoam   Edema: compression hose in place & applied ice     Coordination:  Fine Motor Skills-Upper: Left Intact; Right Intact    Gross Motor Skills-Upper: Left Intact; Right Intact    Balance:  Sitting: Intact  Standing: Intact; With support    Strength: BUE  Strength: Generally decreased, functional    Tone & Sensation:BUE  Sensation: Intact    Range of Motion:BUE  AROM: Generally decreased, functional    Functional Mobility and Transfers for ADLs:  Bed Mobility:  Supine to Sit: Supervision;Modified independent  Sit to Supine: Modified independent  Scooting: Modified independent  Transfers:  Sit to Stand: Modified independent; Adaptive equipment  Bed to Chair: Modified independent; Adaptive equipment   Toilet Transfer : Modified independent    ADL Assessment/Intervention:  Feeding: Independent  Oral Facial Hygiene/Grooming: Modified Independent  Bathing: Modified independent  Upper Body Dressing: Modified independent  Lower Body Dressing: Modified independent  Toileting: Modified independent    Grooming  Position Performed: Standing  Washing Hands: Modified independent  Brushing Teeth: Modified independent    Upper Body Dressing Assistance  Bra: Modified independent  Pullover Shirt: Modified independent  Lower Body Dressing Assistance  Underpants: Modified independent  Socks: Modified independent  Position Performed: Bending forward method;Seated edge of bed    LE Adaptive Equipment:  [x] Adaptive Equipment was not issued due to patient able to complete with out use of AE and use of AE would prevent stretching needed to progress with recovery. (Patient able to complete w/ compensatory strategies and able to compensate for socks w/ clothing modifications, but will require assist with Compression hose). Cognitive Retraining  Safety/Judgement: Awareness of environment; Fall prevention; Insight into deficits    Pain:  Pain level pre-treatment: 2/10  Pain level post-treatment: 2/10  Pain Intervention(s): Medication administer by Nursing (see MAR); Rest, Ice, Repositioning   Response to intervention: Nurse notified, see doc flow sheet    Activity Tolerance:   Fair+. Patient able to stand 3-4 minute(s). Patient able to complete ADLs with  intermittent rest breaks. Please refer to the flowsheet for vital signs taken during this treatment. After treatment:   []  Patient left in no apparent distress sitting up in chair  []  Patient sitting on EOB  []  Patient left in no apparent distress in bed  [x]  Call bell left within reach  [x]  Nursing notified  []  Caregiver present  [x]  Ice applied  []  SCD's on while back in bed    COMMUNICATION/EDUCATION:   Communication/Collaboration:  [x]       Role of Occupational Therapy in the acute care setting. [x]      Home safety education was provided and the patient/caregiver indicated understanding. [x]      Patient/family have participated as able in goal setting and plan of care.   [x] Patient/family agree to work toward stated goals and plan of care. []      Patient understands intent and goals of therapy, but is neutral about his/her participation. []      Patient is unable to participate in plan of care at this time. Thank you for this referral.  Kathryn Lee, OTR/L, CSRS. CDCS  Time Calculation: 25 mins    Eval Complexity: History: MEDIUM Complexity : Expanded review of history including physical, cognitive and psychosocial  history ; Examination: LOW Complexity : 1-3 performance deficits relating to physical, cognitive , or psychosocial skils that result in activity limitations and / or participation restrictions ;    Decision Making:LOW Complexity : No comorbidities that affect functional and no verbal or physical assistance needed to complete eval tasks

## 2020-07-16 NOTE — PROGRESS NOTES
0910-PA said pt can have phenergan but prefer not to give scopalamine patch after surgery due to side effects and efficacy. Gaby Rodríguez can have Phenergan 25mg PO.

## 2020-07-16 NOTE — OP NOTES
[address]  [address] [place] [address] [facility] [address] [place] [address] [phone]    TOTAL KNEE ARTHROPLASTY    Patient: oCle Larkin MRN: [id number]  SSN: [id number]    YOB: 1945  Age: 76 y.o. Sex: female      Date of Surgery: 7/15/2020   Preoperative Diagnosis: RHEUMATOID ARTHRITIS RIGHT KNEE   Postoperative Diagnosis: RHEUMATOID ARTHRITIS RIGHT KNEE   Location: [facility]  Surgeon: Quique Box MD  Physician Assistant: none  Assistant: Circ-1: Cydney Silverio  Circ-Relief: Yohana Menjivar RN  Scrub Tech-Relief: Yancy Aguayo  Scrub RN-1: Stacia Gonzalez RN  Surg Asst-1: Radha Acevedo  Surg Asst-2: Simón Maxwell    Anesthesia: Spinal + Low femoral Nerve Block + local    Procedure: Right Total Knee Arthroplasty (CPT: 45382)    Findings:  Degenerative joint disease of the right knee     Estimated Blood Loss: 50 mL    Fluids: see anesthesia record    Specimens: None    Cultures: None    Complications: None    Tourniquet Time:   Total Tourniquet Time Documented:  Thigh (Right) - 54 minutes  Total: Thigh (Right) - 54 minutes    Tourniquet Pressure: 250 mm Hg    Tranexamic Acid: 1 gram IV: one dose at begining of case and one at the end    Exparel solution: 20 mL (13 mg/mL) + 40 mL NS    Implants:   Implant Name Type Inv.  Item Serial No.  Lot No. LRB No. Used Action   CEMENT HI VISCOSITY SMARTSET 40GR - [id number]  CEMENT HI VISCOSITY SMARTSET 40GR  [facility] [employer] [id number] Right 1 Implanted   CEMENT BNE GENTAMC GHV 40GM -- SMARTSET - [id number]  CEMENT BNE GENTAMC GHV 40GM -- SMARTSET  [facility] [employer] [id number] Right 1 Implanted   TIBIAL BASE   N/A [employer] [id number] Right 1 Implanted   IMPL KNEE FEMORAL APARNA SZ4 - SN/A  FEM PS ABDULLAHI SZ 4 RT APARNA -- ATTUNE N/A [facility] [employer] [id number] Right 1 Implanted   IMPL KNEE PATELLA CEMNTED [id number] - SN/A  IMPL KNEE PATELLA CEMNTED [id number] N/A [facility] [employer] [id number] Right 1 Implanted   IMPL KNEE TIB INSRT ATTUNE SZ4 5MM - SN/A  INSERT TIB PS RP SZ4 5MM -- ATTUNE N/A [facility] [employer] [id number] Right 1 Implanted        Patient Condition: Stable.    ---------  INDICATIONS:   This 76y.o.-year-old female has had pain in the right knee for years and has become functionally disabled with respect to the activities of daily living. The pain is increased with weight-bearing. The pain and dysfunction were not releaved by at least three months of conservative therapy such as NSAIDS (ibuprofen, aleve), analgesics (tylenol), structured flexibility and muscle strengthening physical therapy exercises, activity restrictions, intra-articular cortisone injections, bracing, and use of a cane. The radiographs showed valgus alignment and advanced arthritis with joint space narrowing, subchondral sclerosis, and periarticular osteophytes. A right total knee replacement has been recommended. The risks and the possible complications of this surgery and anesthesia including, but not exclusive to, bleeding, infection, dislocation, fracture, blood clots, nerve and vascular injury, possible need for other procedures, and possibly death have been explained to the patient. The patient accepts these risks for the benefits of joint replacement over the failure of non-operative care to provide adequate pain relief and improve their functional disability. The patients medical problems have been addressed by their primary care physician and are deemed stable and as well controlled as possible. Inpatient hospital care was medically necessary, reasonable, and appropriate. This is a medically necessary procedure. As such, without use of a surgical assistant to retract soft tissues, protect vital neuro-vascular structures and assist in the technical aspects of the operation, this procedure would not have been possible. Therefore this assistant was medically necessary.     DESCRIPTION OF PROCEDURE: After the risks and benefits of surgery were discussed, informed consent was obtained. The patient was identified in the preoperative holding area and the operative extremity was marked. Preoperatively a low femoral nerve block was performed by anesthesia. The patient was taken to the operating room and placed in the supine position. Spinal anesthesia was performed. IV antibiotics were given. After verification of the appropriate operative site from the consent form, with confirmation of surgeon, anesthesia and nursing teams, a tourniquet was placed and the right leg was prepped and draped in sterile fashion using Chloraprep. A formal timeout was performed. The tourniquet was inflated and a midline incision was made over the anterior knee medial to the tibial tubercle and the dissection was taken down to Mignon's fascia. A medial parapatellar arthrotomy was performed, medial release was made and the infrapatellar fat pad was trimmed. The anterior portions of the medial and lateral menisci were excised. The patella measured 21 mm. A freehand patellar cut was made followed by placement of the protective plate. The knee was flexed and the patella was  allowed to subluxate into the lateral gutter and the knee was flexed. Inspection of the knee revealed cartilage loss from all three compartments. The femur was drilled and intramedullary cutting jig was placed in 7 degrees of valgus and 9 mm of distal resection. The distal femoral cut was made. The tibia was then subluxed anteriorly with a large bent knee retractor. The PCL was released from the posterior tibia. The remaining medial and lateral menisci and the periarticular osteophytes were removed. The lateral genicular artery was cauterized. An extramedullary tibial guide was used and a tibial cut was made just inferior to the osteoarticular junction with tibial slope of 7 degrees - matching the patient's native slope.   The extension gap was assessed to ensure full extension could be achieved. The PCL was released. A sizing tibial plate was then pinned into place and alignment was checked. The tibia was reamed and broached and any additional osteophytes were removed. The femoral sizer was used to measure the femoral size as well as estimate femoral component rotation using the posterior condylar line as a reference. Gap measuring blocks and the gap /sizing device was then used to examine flexion and extension gaps and confirm femoral component size and rotation. Positioning pins for the distal femoral cutting block were placed into the femur through the /sizing device. The four-in-one cutting block was placed. Anterior-posterior position of the cutting guide was checked using a LetGive. The anterior, posterior and chamfer cuts were made. Using the Femoral Finishing Guide, posterior osteophytes were removed and the notch bone was removed. Tibial and femoral trial implants were placed. The patella preparation was then completed with sizing and drilling of the patellar lugs. The trial patellar implant was placed and measured 21 mm. Patellar tracking was midline so no lateral retinacular release was needed. Ligament balancing was performed as needed with release of MCL done on approach. The PCL was sacrificed so a PS RP implant was used. Excellent stability was achieved. The trial components were removed. 20 cc of Exparel solution and 10 cc of 0.25% Marcaine were injected into the posterior soft tissues. Care was taken to aspirate prior to injecting to prevent intravascular anesthetic injection. The cement was prepared. After preparing the bony surfaces with pulsatile lavage saline, the real components were cemented into place with the trial liner. Excess cement was removed prior to hardening with the cement allowed to set up with axial pressure across the knee in full extension.   The remaining 40 cc of Exparel solution and an additional 20 cc of 0.25% Marcaine were injected into the deep and superficial soft tissues around the knee as well as the periosteum. After drying of the cement, the knee was checked for any remaining excess cement and irrigated. The tourniquet was released and hemostasis was achieved. The real polyethylene liner was inserted. A standard layered closure was performed using #1 Stratafix PDS for the fascia, 0 and 3-0 Vicryl for the subcutaneous and subcuticular layers followed by a running subcuticular skin closure with a 3-0 Monocryl. PRINEO was applied. Sterile dressings were placed. The patient was awakened and there were no complications. Final sponge and needle counts were correct x2.     Ivet Paez MD

## 2020-07-16 NOTE — PROGRESS NOTES
[address] Rounded on post total knee replacement. Patient .educated: Activity:   OOB for all meals,   Walk every hour to prevent blood clots, help move better and lessen stiffness. Bend knee 10 x /hr  Do not put anything under knee. Towel roll under ankle. Promoting circulation  Use SCD pumps except when walking. Ankle pumps 10 times an hour at hospital & home. Take blood thinner medication as ordered by surgeon. Do not skip a dose. Pain Control:  Pain medications side effects discussed. Wean off narcotics ASAP. Use Tylenol ( 3000 mg/24 hours) , ice, distraction, moving, & change position to help with pain. Rest between activity. Don't get nauseated. Eat a snack before taking pain medication    Do not get constipated: take stool softener/mild laxative daily while on narcotics. Incentive Spirometry:    Use of incentive spirometer 10 x/hr. Wound Care: Dressing intact. Educated patient on how to manage dressing and/or incision per MD protocol. Keep dressing and/or dry and intact. No lotions, powders, creams to surgical leg. .    Diet:   Eat for healing. Protein heals bone/muscle. Drink 8 glasses of water a day. Patient Safety:   Call light & belongings in reach. Call for help when want to walk or get OOB. Educational material given. Patient agreed to keep doing everything at home to prevent complications & have a successful recovery. Patient verbalizing the importance of using incentive spirometer, ankle pumping, walking frequently, doing exercised at home twice a day, taking their anticoagulant per physician instruction, taking medication and drinking water to prevent complication, and eating protein for healing. Patient  verbalized understand. Given the opportunity for asking questions.

## 2020-07-16 NOTE — PROGRESS NOTES
Transition of Care ([facility]) Plan:     Chart reviewed, met with pt prior to discharge home with spouse. FOC offered, pt chose [facility] [phone] for follow up; referral placed with CMS. Pt has RW for home. Virtual reviewer communicated change to CM which reflect outpatient observation order written prior to discharge order being written. Code 44 delivered to patient. CM met with the patient and provided to the patient the printed information about her outpatient observation status. The patient was given the flyer entitled, \"Medicare Outpatient Observation: Information & notification. \" All questions were answered, no additional discharge needs identified at this time and patient expects to return to their (home, assisted living facility, relatives home, etc.) after discharge today. [place] Transportation:   How is patient being transported at discharge? Family/Friend      When? Once cleared by Therapy between 12-2pm     Is transport scheduled? N/A      Follow-up appointment and transportation:   PCP/Specialist?  See AVS for Appointment         Who is transporting to the follow-up appointment? Family/Friend      Is transport for follow up appointment scheduled? N/A    Communication plan (with patient/family): Who is being called? Patient or Next of Kin? Responsible party? Patient      What number(s) is to be used? See Facesheet      What service provider is calling for [facility] services? When are they calling? 24-48 hours following discharge    Readmission Risk? (Green/Low; Yellow/Moderate; Red/High):  Green    Care Management Interventions  PCP Verified by CM:  Yes  Transition of Care Consult (CM Consult): [address]: Yes  Discharge Durable Medical Equipment: No  Physical Therapy Consult: Yes  Occupational Therapy Consult: Yes  Current Support Network: Lives with Spouse  Confirm Follow Up Transport: Family  The Plan for Transition of Care is Related to the Following Treatment Goals :  HH  The Patient and/or Patient Representative was Provided with a Choice of Provider and Agrees with the Discharge Plan?: Yes  Freedom of Choice List was Provided with Basic Dialogue that Supports the Patient's Individualized Plan of Care/Goals, Treatment Preferences and Shares the Quality Data Associated with the Providers?: Yes  Discharge Location  Discharge Placement: Home with home health

## 2020-07-16 NOTE — PROGRESS NOTES
Problem: Mobility Impaired (Adult and Pediatric)  Goal: *Acute Goals and Plan of Care (Insert Text)  Description: Physical Therapy Goals  Initiated 7/15/2020  to be met within 1-2 days  STG's:  1. Bed mobility:  Supine to/from sit with S in prep for ADL activity. 2. Transfers:  Sit to/from stand with S/RW in prep for gait. LTG's  1. Ambulation:  Ambulate 150 ft.with S/RW for home mobility at discharge. 2. Step Negotiation: Ascend/Descend 3-5 steps with CGA with HR for home navigation as indicated. 3. Patient Education:  S/Independent with HEP for home performance accurately at discharge. Outcome: Resolved/Met   PHYSICAL THERAPY TREATMENT AND DISCHARGE    Patient: Marge Anguiano (9 y.o. female)  Date: 7/16/2020  Diagnosis: History of total knee arthroplasty, right [Z96.651]  History of total knee arthroplasty, right [Z96.651]   <principal problem not specified>  Procedure(s) (LRB):  RIGHT TOTAL KNEE ARTHROPLASTY AND ALL INDICATED PROCEDURES (Right) 1 Day Post-Op  Precautions: Fall, WBAT  PLOF: independent, ambulatory without AD, has a RW    ASSESSMENT:  No assistance needed for supine<>sit or sit<<>stand with bed in lowest position. Ambulated 160ft with RW, step to gt pattern, steady pace, no LOB or path deviations. Negotiated 5 steps holding (B) hand rails. Reviewed and performed seated and supine ROM strengthening exercises. Pt left supine in bed. PLAN:  Maximum therapeutic gains met at current level of care and patient will be discharged from physical therapy at this time. Rationale for discharge:  [x]     Goals Achieved  []     [address]  []     Patient not participating in therapy  []     Other:  Discharge Recommendations:  Home Health  Further Equipment Recommendations for Discharge:  rolling walker     SUBJECTIVE:   Patient stated I don't hurt if I don't move.     OBJECTIVE DATA SUMMARY:   Critical Behavior:  Neurologic State: Alert, Appropriate for age  Orientation Level: Oriented X4  Cognition: Appropriate for age attention/concentration  Safety/Judgement: Awareness of environment, Fall prevention, Insight into deficits  Functional Mobility Training:  Bed Mobility:    Supine to Sit: Supervision  Sit to Supine: Supervision  Scooting: Supervision  Transfers:  Sit to Stand: Supervision  Stand to Sit: Supervision  Bed to Chair: Modified independent; Adaptive equipment  Balance:  Sitting: Intact  Standing: Intact; With support   Ambulation/Gait Training:  Distance (ft): 160 Feet (ft)  Assistive Device: Gait belt;Walker, rolling  Ambulation - Level of Assistance: Supervision  Gait Abnormalities: Antalgic;Decreased step clearance; Step to gait  Right Side Weight Bearing: As tolerated  Stairs:  Number of Stairs Trained: 5  Stairs - Level of Assistance: Supervision  Rail Use: Both    Therapeutic Exercises:   RLE      EXERCISE   Sets   Reps   Active Active Assist   Passive Self ROM   Comments   Ankle Pumps    [x] [] [] []    Quad Sets/Glut Sets    [x] [] [] [] Hold for 5 secs   Hamstring Sets   [x] [] [] []    Short Arc Quads   [x] [] [] []    Heel Slides   [x] [] [] []    Straight Leg Raises   [x] [] [] []    Hip Add   [] [] [] [] Hold for 5 secs, w/ pillow squeeze   Long Arc Quads   [x] [] [] []    Seated HS   [x] [] [] []    Assisted LAQ   [x] [] [] []       [] [] [] []        Pain:  Pain level pre-treatment: 0/10   Pain level post-treatment: 3/10   Pain Intervention(s): Medication (see MAR); Rest, Ice, Repositioning   Response to intervention: Nurse notified, See doc flow    Activity Tolerance:   Good  Please refer to the flowsheet for vital signs taken during this treatment.   After treatment:   [] Patient left in no apparent distress sitting up in chair  [x] Patient left in no apparent distress in bed  [x] Call bell left within reach  [x] Nursing notified  [] Caregiver present  [] Bed alarm activated  [] SCDs applied      COMMUNICATION/EDUCATION:   [x]         Role of Physical Therapy in the acute care setting. [x]         Fall prevention education was provided and the patient/caregiver indicated understanding. [x]         Patient/family have participated as able and agree with findings and recommendations. []         Patient is unable to participate in plan of care at this time.   []         Other:        Jumana Andrade PTA   Time Calculation: 19 mins

## 2020-07-17 ENCOUNTER — HOME CARE VISIT (OUTPATIENT)
Dept: SCHEDULING | Facility: HOME HEALTH | Age: 75
End: 2020-07-17
Payer: MEDICARE

## 2020-07-17 ENCOUNTER — TELEPHONE (OUTPATIENT)
Dept: MEDSURG UNIT | Age: 75
End: 2020-07-17

## 2020-07-17 ENCOUNTER — HOME CARE VISIT (OUTPATIENT)
Dept: HOME HEALTH SERVICES | Facility: HOME HEALTH | Age: 75
End: 2020-07-17
Payer: MEDICARE

## 2020-07-17 PROCEDURE — 400013 HH SOC

## 2020-07-17 PROCEDURE — G0151 HHCP-SERV OF PT,EA 15 MIN: HCPCS

## 2020-07-17 PROCEDURE — 3331090002 HH PPS REVENUE DEBIT

## 2020-07-17 PROCEDURE — G0299 HHS/HOSPICE OF RN EA 15 MIN: HCPCS

## 2020-07-17 PROCEDURE — 3331090001 HH PPS REVENUE CREDIT

## 2020-07-17 PROCEDURE — A6213 FOAM DRG >16<=48 SQ IN W/BDR: HCPCS

## 2020-07-17 NOTE — TELEPHONE ENCOUNTER
Minor Garcia post total knee replacement follow up call. PT is coming today. Pain is manageable  Dressing looks dry and intact. Not getting in a bathtub, swimming pool or hot tub. Doing PT exercises as shown twice a day per MD protocol. Patient is changing positions frequently and walking every hour short distance to help with stiffness and soreness. Using ice for pain control and swelling as instructed. She is eating protein and drinking plenty of fluids for healing  Bowels have not moved since surgery so going to take a stool softener. Taking blood thinner medication as prescribed  She knows when their follow up appointment is. Patient reports no problems. at home.

## 2020-07-18 ENCOUNTER — HOME CARE VISIT (OUTPATIENT)
Dept: SCHEDULING | Facility: HOME HEALTH | Age: 75
End: 2020-07-18
Payer: MEDICARE

## 2020-07-18 VITALS
OXYGEN SATURATION: 94 % | SYSTOLIC BLOOD PRESSURE: 122 MMHG | RESPIRATION RATE: 14 BRPM | HEART RATE: 92 BPM | TEMPERATURE: 97.7 F | DIASTOLIC BLOOD PRESSURE: 64 MMHG

## 2020-07-18 PROCEDURE — G0157 HHC PT ASSISTANT EA 15: HCPCS

## 2020-07-18 PROCEDURE — 3331090002 HH PPS REVENUE DEBIT

## 2020-07-18 PROCEDURE — 3331090001 HH PPS REVENUE CREDIT

## 2020-07-19 VITALS — TEMPERATURE: 98.8 F | OXYGEN SATURATION: 97 % | HEART RATE: 89 BPM

## 2020-07-19 VITALS
RESPIRATION RATE: 18 BRPM | OXYGEN SATURATION: 97 % | DIASTOLIC BLOOD PRESSURE: 68 MMHG | TEMPERATURE: 98.4 F | SYSTOLIC BLOOD PRESSURE: 109 MMHG | HEART RATE: 82 BPM

## 2020-07-19 PROCEDURE — 3331090002 HH PPS REVENUE DEBIT

## 2020-07-19 PROCEDURE — 3331090001 HH PPS REVENUE CREDIT

## 2020-07-20 ENCOUNTER — HOME CARE VISIT (OUTPATIENT)
Dept: HOME HEALTH SERVICES | Facility: HOME HEALTH | Age: 75
End: 2020-07-20
Payer: MEDICARE

## 2020-07-20 ENCOUNTER — HOME CARE VISIT (OUTPATIENT)
Dept: SCHEDULING | Facility: HOME HEALTH | Age: 75
End: 2020-07-20
Payer: MEDICARE

## 2020-07-20 PROCEDURE — G0157 HHC PT ASSISTANT EA 15: HCPCS

## 2020-07-20 PROCEDURE — 3331090001 HH PPS REVENUE CREDIT

## 2020-07-20 PROCEDURE — 3331090002 HH PPS REVENUE DEBIT

## 2020-07-21 PROCEDURE — 3331090002 HH PPS REVENUE DEBIT

## 2020-07-21 PROCEDURE — 3331090001 HH PPS REVENUE CREDIT

## 2020-07-22 ENCOUNTER — HOME CARE VISIT (OUTPATIENT)
Dept: SCHEDULING | Facility: HOME HEALTH | Age: 75
End: 2020-07-22
Payer: MEDICARE

## 2020-07-22 VITALS
RESPIRATION RATE: 17 BRPM | SYSTOLIC BLOOD PRESSURE: 98 MMHG | HEART RATE: 89 BPM | OXYGEN SATURATION: 97 % | DIASTOLIC BLOOD PRESSURE: 62 MMHG | TEMPERATURE: 98.4 F

## 2020-07-22 VITALS
RESPIRATION RATE: 18 BRPM | HEART RATE: 77 BPM | OXYGEN SATURATION: 98 % | TEMPERATURE: 98.4 F | DIASTOLIC BLOOD PRESSURE: 66 MMHG | SYSTOLIC BLOOD PRESSURE: 133 MMHG

## 2020-07-22 PROCEDURE — 3331090002 HH PPS REVENUE DEBIT

## 2020-07-22 PROCEDURE — G0151 HHCP-SERV OF PT,EA 15 MIN: HCPCS

## 2020-07-22 PROCEDURE — G0299 HHS/HOSPICE OF RN EA 15 MIN: HCPCS

## 2020-07-22 PROCEDURE — 3331090001 HH PPS REVENUE CREDIT

## 2020-07-23 PROCEDURE — 3331090001 HH PPS REVENUE CREDIT

## 2020-07-23 PROCEDURE — 3331090002 HH PPS REVENUE DEBIT

## 2020-07-24 ENCOUNTER — HOME CARE VISIT (OUTPATIENT)
Dept: SCHEDULING | Facility: HOME HEALTH | Age: 75
End: 2020-07-24
Payer: MEDICARE

## 2020-07-24 VITALS
OXYGEN SATURATION: 96 % | TEMPERATURE: 98.7 F | HEART RATE: 86 BPM | SYSTOLIC BLOOD PRESSURE: 120 MMHG | RESPIRATION RATE: 16 BRPM | DIASTOLIC BLOOD PRESSURE: 64 MMHG

## 2020-07-24 PROCEDURE — 3331090002 HH PPS REVENUE DEBIT

## 2020-07-24 PROCEDURE — 3331090001 HH PPS REVENUE CREDIT

## 2020-07-24 PROCEDURE — G0151 HHCP-SERV OF PT,EA 15 MIN: HCPCS

## 2020-07-25 PROCEDURE — 3331090002 HH PPS REVENUE DEBIT

## 2020-07-25 PROCEDURE — 3331090001 HH PPS REVENUE CREDIT

## 2020-07-26 PROCEDURE — 3331090002 HH PPS REVENUE DEBIT

## 2020-07-26 PROCEDURE — 3331090001 HH PPS REVENUE CREDIT

## 2020-07-27 ENCOUNTER — HOME CARE VISIT (OUTPATIENT)
Dept: SCHEDULING | Facility: HOME HEALTH | Age: 75
End: 2020-07-27
Payer: MEDICARE

## 2020-07-27 VITALS
OXYGEN SATURATION: 98 % | HEART RATE: 96 BPM | TEMPERATURE: 96.5 F | RESPIRATION RATE: 14 BRPM | SYSTOLIC BLOOD PRESSURE: 122 MMHG | DIASTOLIC BLOOD PRESSURE: 68 MMHG

## 2020-07-27 PROCEDURE — 3331090002 HH PPS REVENUE DEBIT

## 2020-07-27 PROCEDURE — G0151 HHCP-SERV OF PT,EA 15 MIN: HCPCS

## 2020-07-27 PROCEDURE — 3331090001 HH PPS REVENUE CREDIT

## 2020-07-28 PROCEDURE — 3331090002 HH PPS REVENUE DEBIT

## 2020-07-28 PROCEDURE — 3331090001 HH PPS REVENUE CREDIT

## 2020-07-29 ENCOUNTER — HOME CARE VISIT (OUTPATIENT)
Dept: SCHEDULING | Facility: HOME HEALTH | Age: 75
End: 2020-07-29
Payer: MEDICARE

## 2020-07-29 VITALS
OXYGEN SATURATION: 96 % | RESPIRATION RATE: 14 BRPM | HEART RATE: 87 BPM | SYSTOLIC BLOOD PRESSURE: 126 MMHG | DIASTOLIC BLOOD PRESSURE: 72 MMHG | TEMPERATURE: 96.9 F

## 2020-07-29 PROCEDURE — G0495 RN CARE TRAIN/EDU IN HH: HCPCS

## 2020-07-29 PROCEDURE — 3331090001 HH PPS REVENUE CREDIT

## 2020-07-29 PROCEDURE — A6213 FOAM DRG >16<=48 SQ IN W/BDR: HCPCS

## 2020-07-29 PROCEDURE — 3331090002 HH PPS REVENUE DEBIT

## 2020-07-30 ENCOUNTER — HOME CARE VISIT (OUTPATIENT)
Dept: SCHEDULING | Facility: HOME HEALTH | Age: 75
End: 2020-07-30
Payer: MEDICARE

## 2020-07-30 VITALS
OXYGEN SATURATION: 98 % | SYSTOLIC BLOOD PRESSURE: 118 MMHG | HEART RATE: 78 BPM | RESPIRATION RATE: 14 BRPM | TEMPERATURE: 98.6 F | DIASTOLIC BLOOD PRESSURE: 62 MMHG

## 2020-07-30 PROCEDURE — 3331090002 HH PPS REVENUE DEBIT

## 2020-07-30 PROCEDURE — 3331090001 HH PPS REVENUE CREDIT

## 2020-07-30 PROCEDURE — G0151 HHCP-SERV OF PT,EA 15 MIN: HCPCS

## 2020-07-31 ENCOUNTER — HOME CARE VISIT (OUTPATIENT)
Dept: SCHEDULING | Facility: HOME HEALTH | Age: 75
End: 2020-07-31
Payer: MEDICARE

## 2020-07-31 VITALS
OXYGEN SATURATION: 97 % | SYSTOLIC BLOOD PRESSURE: 118 MMHG | RESPIRATION RATE: 15 BRPM | DIASTOLIC BLOOD PRESSURE: 68 MMHG | TEMPERATURE: 99 F | HEART RATE: 88 BPM

## 2020-07-31 PROCEDURE — G0151 HHCP-SERV OF PT,EA 15 MIN: HCPCS

## 2020-07-31 PROCEDURE — 3331090002 HH PPS REVENUE DEBIT

## 2020-07-31 PROCEDURE — 3331090001 HH PPS REVENUE CREDIT

## 2020-08-01 PROCEDURE — 3331090001 HH PPS REVENUE CREDIT

## 2020-08-01 PROCEDURE — 3331090002 HH PPS REVENUE DEBIT

## 2020-08-02 PROCEDURE — 3331090002 HH PPS REVENUE DEBIT

## 2020-08-02 PROCEDURE — 3331090001 HH PPS REVENUE CREDIT

## 2020-08-03 ENCOUNTER — HOME CARE VISIT (OUTPATIENT)
Dept: SCHEDULING | Facility: HOME HEALTH | Age: 75
End: 2020-08-03
Payer: MEDICARE

## 2020-08-03 PROCEDURE — G0157 HHC PT ASSISTANT EA 15: HCPCS

## 2020-08-03 PROCEDURE — 3331090001 HH PPS REVENUE CREDIT

## 2020-08-03 PROCEDURE — 3331090002 HH PPS REVENUE DEBIT

## 2020-08-04 ENCOUNTER — HOME CARE VISIT (OUTPATIENT)
Dept: SCHEDULING | Facility: HOME HEALTH | Age: 75
End: 2020-08-04
Payer: MEDICARE

## 2020-08-04 VITALS
SYSTOLIC BLOOD PRESSURE: 112 MMHG | HEART RATE: 81 BPM | TEMPERATURE: 99.5 F | OXYGEN SATURATION: 97 % | DIASTOLIC BLOOD PRESSURE: 70 MMHG

## 2020-08-04 VITALS
HEART RATE: 84 BPM | SYSTOLIC BLOOD PRESSURE: 119 MMHG | OXYGEN SATURATION: 96 % | DIASTOLIC BLOOD PRESSURE: 61 MMHG | RESPIRATION RATE: 14 BRPM | TEMPERATURE: 98.1 F

## 2020-08-04 PROCEDURE — G0495 RN CARE TRAIN/EDU IN HH: HCPCS

## 2020-08-04 PROCEDURE — 3331090001 HH PPS REVENUE CREDIT

## 2020-08-04 PROCEDURE — 3331090002 HH PPS REVENUE DEBIT

## 2020-08-05 ENCOUNTER — HOME CARE VISIT (OUTPATIENT)
Dept: SCHEDULING | Facility: HOME HEALTH | Age: 75
End: 2020-08-05
Payer: MEDICARE

## 2020-08-05 VITALS
DIASTOLIC BLOOD PRESSURE: 67 MMHG | HEART RATE: 78 BPM | SYSTOLIC BLOOD PRESSURE: 119 MMHG | TEMPERATURE: 99.4 F | OXYGEN SATURATION: 95 %

## 2020-08-05 PROCEDURE — G0157 HHC PT ASSISTANT EA 15: HCPCS

## 2020-08-05 PROCEDURE — 3331090001 HH PPS REVENUE CREDIT

## 2020-08-05 PROCEDURE — 3331090002 HH PPS REVENUE DEBIT

## 2020-08-06 PROCEDURE — 3331090001 HH PPS REVENUE CREDIT

## 2020-08-06 PROCEDURE — 3331090002 HH PPS REVENUE DEBIT

## 2020-08-07 ENCOUNTER — HOME CARE VISIT (OUTPATIENT)
Dept: SCHEDULING | Facility: HOME HEALTH | Age: 75
End: 2020-08-07
Payer: MEDICARE

## 2020-08-07 PROCEDURE — 3331090001 HH PPS REVENUE CREDIT

## 2020-08-07 PROCEDURE — 3331090002 HH PPS REVENUE DEBIT

## 2020-08-07 PROCEDURE — G0157 HHC PT ASSISTANT EA 15: HCPCS

## 2020-08-08 PROCEDURE — 3331090001 HH PPS REVENUE CREDIT

## 2020-08-08 PROCEDURE — 3331090002 HH PPS REVENUE DEBIT

## 2020-08-09 PROCEDURE — 3331090001 HH PPS REVENUE CREDIT

## 2020-08-09 PROCEDURE — 3331090002 HH PPS REVENUE DEBIT

## 2020-08-10 VITALS
TEMPERATURE: 98.2 F | HEART RATE: 91 BPM | SYSTOLIC BLOOD PRESSURE: 117 MMHG | DIASTOLIC BLOOD PRESSURE: 76 MMHG | OXYGEN SATURATION: 98 %

## 2020-08-10 PROCEDURE — 3331090001 HH PPS REVENUE CREDIT

## 2020-08-10 PROCEDURE — 3331090002 HH PPS REVENUE DEBIT

## 2020-08-11 ENCOUNTER — HOME CARE VISIT (OUTPATIENT)
Dept: SCHEDULING | Facility: HOME HEALTH | Age: 75
End: 2020-08-11
Payer: MEDICARE

## 2020-08-11 VITALS
RESPIRATION RATE: 16 BRPM | OXYGEN SATURATION: 98 % | HEART RATE: 83 BPM | DIASTOLIC BLOOD PRESSURE: 78 MMHG | TEMPERATURE: 98 F | SYSTOLIC BLOOD PRESSURE: 123 MMHG

## 2020-08-11 PROCEDURE — 3331090001 HH PPS REVENUE CREDIT

## 2020-08-11 PROCEDURE — G0151 HHCP-SERV OF PT,EA 15 MIN: HCPCS

## 2020-08-11 PROCEDURE — 3331090002 HH PPS REVENUE DEBIT

## (undated) DEVICE — TOWEL,OR,DSP,ST,BLUE,STD,4/PK,20PK/CS: Brand: MEDLINE

## (undated) DEVICE — T4 ZIPPER TOGA, (L/XL)

## (undated) DEVICE — SOL INJ L R 1000ML BG --

## (undated) DEVICE — STERILE POLYISOPRENE POWDER-FREE SURGICAL GLOVES: Brand: PROTEXIS

## (undated) DEVICE — BLADE SAW 1.19X20X90 MM FOR LG BNE

## (undated) DEVICE — STRIP,CLOSURE,WOUND,MEDI-STRIP,1/2X4: Brand: MEDLINE

## (undated) DEVICE — Device

## (undated) DEVICE — 3 BONE CEMENT MIXER: Brand: MIXEVAC

## (undated) DEVICE — SYSTEM SKIN CLSR 22CM DERMBND PRINEO

## (undated) DEVICE — (D)PREP SKN CHLRAPRP APPL 26ML -- CONVERT TO ITEM 371833

## (undated) DEVICE — STERILE POLYISOPRENE POWDER-FREE SURGICAL GLOVES WITH EMOLLIENT COATING: Brand: PROTEXIS

## (undated) DEVICE — GARMENT COMPR M FOR 13IN FT INTMIT SGL BLDR HEM FORC II

## (undated) DEVICE — T5 HOOD WITH PEEL AWAY FACE SHIELD

## (undated) DEVICE — OPTIFOAM GENTLE SA, POSTOP, 4X12: Brand: MEDLINE

## (undated) DEVICE — BLADE SAW W13XL90MM 1.19MM PARA

## (undated) DEVICE — SUT VCRL + 0 36IN CT1 UD --

## (undated) DEVICE — PACK PROCEDURE SURG TOT KNEE CUST

## (undated) DEVICE — SOL IRR NACL 0.9% 500ML POUR --

## (undated) DEVICE — HANDPIECE SET WITH FAN SPRAY TIP: Brand: INTERPULSE

## (undated) DEVICE — ZIMMER® STERILE DISPOSABLE TOURNIQUET CUFF WITH PROTECTIVE SLEEVE AND PLC, SINGLE PORT, SINGLE BLADDER, 34 IN. (86 CM)

## (undated) DEVICE — SUT VCRL + 1 36IN CT1 VIO --

## (undated) DEVICE — DERMABOND SKIN ADH 0.7ML -- DERMABOND ADVANCED 12/BX

## (undated) DEVICE — SYR 10ML CTRL LR LCK NSAF LF --

## (undated) DEVICE — SUT MCRYL + 3-0 27IN PS1 UD --

## (undated) DEVICE — NEEDLE HYPO 22GA L1.5IN BLK S STL HUB POLYPR SHLD REG BVL

## (undated) DEVICE — SHEET,DRAPE,70X85,STERILE: Brand: MEDLINE

## (undated) DEVICE — TUBING SUCT L20IN DIA0.1875IN W/ UNIV SCALLOPED FEM CONN

## (undated) DEVICE — SUTURE STRATAFIX SZ 1 L14IN ABSRB VLT L36MM MO-4 TAPERPOINT SXPD2B400

## (undated) DEVICE — UNDERCAST PADDING: Brand: DEROYAL

## (undated) DEVICE — DRSG FOAM MEPILX PST OP 4X12IN --

## (undated) DEVICE — Z INACTIVE USE 2720250 BIT DRL 3.2X145MM

## (undated) DEVICE — LINER GLV L R FULL FNGR KEVLAR LYCRA CUT RESIST PWD FREE ST

## (undated) DEVICE — STERILE TETRA-FLEX CF LF, 6IN X 11 YD: Brand: TETRA-FLEX™ CF

## (undated) DEVICE — PREP SKN CHLRAPRP APL 26ML STR --

## (undated) DEVICE — SUT VCRL + 3-0 27IN CT2 UD --